# Patient Record
Sex: FEMALE | Race: WHITE | NOT HISPANIC OR LATINO | Employment: FULL TIME | URBAN - METROPOLITAN AREA
[De-identification: names, ages, dates, MRNs, and addresses within clinical notes are randomized per-mention and may not be internally consistent; named-entity substitution may affect disease eponyms.]

---

## 2017-01-19 DIAGNOSIS — Z78.0 MENOPAUSE: ICD-10-CM

## 2017-01-19 RX ORDER — ESTRADIOL 1 MG/1
TABLET ORAL
Qty: 90 TABLET | Refills: 0 | Status: SHIPPED | OUTPATIENT
Start: 2017-01-19 | End: 2017-05-05 | Stop reason: SDUPTHER

## 2017-02-22 ENCOUNTER — OFFICE VISIT (OUTPATIENT)
Dept: INTERNAL MEDICINE | Facility: CLINIC | Age: 52
End: 2017-02-22
Payer: COMMERCIAL

## 2017-02-22 ENCOUNTER — LAB VISIT (OUTPATIENT)
Dept: LAB | Facility: HOSPITAL | Age: 52
End: 2017-02-22
Attending: INTERNAL MEDICINE
Payer: COMMERCIAL

## 2017-02-22 VITALS
TEMPERATURE: 99 F | HEART RATE: 120 BPM | WEIGHT: 130.31 LBS | RESPIRATION RATE: 14 BRPM | SYSTOLIC BLOOD PRESSURE: 139 MMHG | HEIGHT: 65 IN | BODY MASS INDEX: 21.71 KG/M2 | DIASTOLIC BLOOD PRESSURE: 82 MMHG

## 2017-02-22 DIAGNOSIS — F41.9 ANXIETY: Primary | ICD-10-CM

## 2017-02-22 DIAGNOSIS — R00.0 TACHYCARDIA: ICD-10-CM

## 2017-02-22 LAB — TSH SERPL DL<=0.005 MIU/L-ACNC: 1.99 UIU/ML

## 2017-02-22 PROCEDURE — 99214 OFFICE O/P EST MOD 30 MIN: CPT | Mod: S$GLB,,, | Performed by: INTERNAL MEDICINE

## 2017-02-22 PROCEDURE — 84443 ASSAY THYROID STIM HORMONE: CPT

## 2017-02-22 PROCEDURE — 1160F RVW MEDS BY RX/DR IN RCRD: CPT | Mod: S$GLB,,, | Performed by: INTERNAL MEDICINE

## 2017-02-22 PROCEDURE — 99999 PR PBB SHADOW E&M-EST. PATIENT-LVL III: CPT | Mod: PBBFAC,,, | Performed by: INTERNAL MEDICINE

## 2017-02-22 PROCEDURE — 93005 ELECTROCARDIOGRAM TRACING: CPT | Mod: S$GLB,,, | Performed by: INTERNAL MEDICINE

## 2017-02-22 PROCEDURE — 36415 COLL VENOUS BLD VENIPUNCTURE: CPT | Mod: PO

## 2017-02-22 PROCEDURE — 93010 ELECTROCARDIOGRAM REPORT: CPT | Mod: S$GLB,,, | Performed by: INTERNAL MEDICINE

## 2017-02-22 RX ORDER — CITALOPRAM 40 MG/1
TABLET, FILM COATED ORAL
Qty: 30 TABLET | Refills: 12 | Status: SHIPPED | OUTPATIENT
Start: 2017-02-22 | End: 2018-03-05

## 2017-02-22 RX ORDER — FLUTICASONE PROPIONATE 50 MCG
1 SPRAY, SUSPENSION (ML) NASAL CONTINUOUS PRN
Qty: 1 BOTTLE | Refills: 3 | Status: SHIPPED | OUTPATIENT
Start: 2017-02-22 | End: 2022-10-23 | Stop reason: SDUPTHER

## 2017-02-22 RX ORDER — FLUTICASONE PROPIONATE 50 MCG
1 SPRAY, SUSPENSION (ML) NASAL CONTINUOUS PRN
Qty: 1 BOTTLE | Refills: 3 | Status: SHIPPED | OUTPATIENT
Start: 2017-02-22 | End: 2017-02-22 | Stop reason: SDUPTHER

## 2017-02-22 NOTE — PROGRESS NOTES
CC: followup of anxiety  HPI:  The patient is a 51 y.o. year old female who presents to the office for followup of anxiety.  The patient reports her symptoms have improved.  She is having a much better experience at work.  The patient states she is sleeping okay at night.  She is maintaining her sobriety.     PAST MEDICAL HISTORY:  Past Medical History   Diagnosis Date    Anxiety     Substance abuse      ETOH, Cocaine, Methamphetamines (Ecstasy, Crystal meth)       SURGICAL HISTORY:  Past Surgical History   Procedure Laterality Date     section         MEDS:  Medcard reviewed and updated    ALLERGIES: Allergy Card reviewed and updated    SOCIAL HISTORY:   The patient is a nonsmoker.    PE:   APPEARANCE: Well nourished, well developed, in no acute distress.    CHEST: Lungs clear to auscultation with unlabored respirations.  CARDIOVASCULAR: Tachycardic S1, S2. No murmurs. No carotid bruits. No pedal edema.  ABDOMEN: Bowel sounds normal. Not distended. Soft. No tenderness or masses.   PSYCHIATRIC: The patient is oriented to person, place, and time and has a pleasant affect.        ASSESSMENT/PLAN:  Soledad was seen today for follow-up.    Diagnoses and all orders for this visit:    Anxiety  -     Controlled  -     Continue current medication    Tachycardia  -     EKG 12-lead  -     TSH; Future

## 2017-02-22 NOTE — MR AVS SNAPSHOT
[facility]   [facility]  [place] [address]  Phone: [phone]  Fax: [phone]                  Soledad Onofre   2017 8:00 AM   Office Visit    Description:  Female : 1965   Provider:  Grace Casillas MD   Department:  [facility]           Reason for Visit     Follow-up           Diagnoses this Visit        Comments    Anxiety    -  Primary     Tachycardia                To Do List           Future Appointments        Provider Department Dept Phone    2017 3:40 PM Grace Casillas MD [facility] [phone]      Goals (5 Years of Data)     None      Follow-Up and Disposition     Return in about 6 months (around 2017).       These Medications        Disp Refills Start End    citalopram (CELEXA) 40 MG tablet 30 tablet 12 2017     TAKE 1 TABLET (40 MG TOTAL) BY MOUTH EVERY MORNING.    Pharmacy: [facility]/pharmacy #[id number] - [place] - [address] Ph #: [phone]       fluticasone (FLONASE) 50 mcg/actuation nasal spray 1 Bottle 3 2017     1 spray by Each Nare route continuous prn for Rhinitis. - Each Nare    Pharmacy: [facility]/pharmacy #[id number] - [place] - [address] Ph #: [phone]         [facility]     [facility] On Call Nurse Care Line - 24/7 Assistance  Registered nurses in the [facility] On Call Center provide clinical advisement, health education, appointment booking, and other advisory services.  Call for this free service at [phone].             Medications           Message regarding Medications     Verify the changes and/or additions to your medication regime listed below are the same as discussed with your clinician today.  If any of these changes or additions are incorrect, please notify your healthcare provider.             Verify that the below list of medications is an accurate representation of the medications you are currently taking.  If  "none reported, the list may be blank. If incorrect, please contact your healthcare provider. Carry this list with you in case of emergency.           Current Medications     citalopram (CELEXA) 40 MG tablet TAKE 1 TABLET (40 MG TOTAL) BY MOUTH EVERY MORNING.    estradiol (ESTRACE) 1 MG tablet TAKE 1 TABLET (1 MG TOTAL) BY MOUTH ONCE DAILY.    fluticasone (FLONASE) 50 mcg/actuation nasal spray 1 spray by Each Nare route continuous prn for Rhinitis.    norethindrone (MICRONOR) 0.35 mg tablet Take 1 tablet (0.35 mg total) by mouth once daily.    trazodone (DESYREL) 100 MG tablet Take 1 tablet (100 mg total) by mouth nightly as needed for Insomnia.           Clinical Reference Information           Your Vitals Were     BP Pulse Temp Resp Height Weight    139/82 (BP Location: Left arm, Patient Position: Sitting, BP Method: Automatic) 120 98.5 °F (36.9 °C) (Oral) 14 5' 5" (1.651 m) 59.1 kg (130 lb 4.7 oz)    Last Period BMI             05/22/2014 21.68 kg/m2         Blood Pressure          Most Recent Value    BP  139/82      Allergies as of 2/22/2017     No Known Allergies      Immunizations Administered on Date of Encounter - 2/22/2017     None      Orders Placed During Today's Visit      Normal Orders This Visit    EKG 12-lead     Future Labs/Procedures Expected by Expires    TSH  2/22/2017 4/23/2018      [facility] Sign-Up     Activating your [facility] account is as easy as 1-2-3!     1) Visit [email or web address], select Sign Up Now, enter this activation code and your date of birth, then select Next.  [email or web address]  Expires: 4/8/2017  8:40 AM      2) Create a username and password to use when you visit [facility] in the future and select a security question in case you lose your password and select Next.    3) Enter your e-mail address and click Sign Up!    Additional Information  If you have questions, please e-mail [email or web address] or call [phone] to talk to our [facility] staff. Remember, [facility] is NOT to " be used for urgent needs. For medical emergencies, dial 911.         Language Assistance Services     ATTENTION: Language assistance services are available, free of charge. Please call [phone].      ATENCIÓN: Si habla edi, tiene a smith disposición servicios gratuitos de asistencia lingüística. Llame al [phone].     CHÚ Ý: N?u b?n nói Ti?ng Vi?t, có các d?ch v? h? tr? ngôn ng? mi?n phí dành cho b?n. G?i s? [phone].         [facility] complies with applicable Federal civil rights laws and does not discriminate on the basis of race, color, national origin, age, disability, or sex.

## 2017-02-23 ENCOUNTER — TELEPHONE (OUTPATIENT)
Dept: INTERNAL MEDICINE | Facility: CLINIC | Age: 52
End: 2017-02-23

## 2017-03-07 ENCOUNTER — TELEPHONE (OUTPATIENT)
Dept: INTERNAL MEDICINE | Facility: CLINIC | Age: 52
End: 2017-03-07

## 2017-03-07 NOTE — LETTER
[place] [facility]  Internal Medicine  [address]  [place] [address]  Phone: [phone]  Fax: [phone]   March 7, 2017     Patient: Soledad Onofre   YOB: 1965   Date of Visit: 3/7/2017       To Whom it May Concern:    Soledad Onofre was out of work on 03/07/2017 due to her own personal chronic illness.  Soledad may return to work on 03/08/2017 with no restrictions and normal work activities.   If you have any questions or concerns, please don't hesitate to call.    Sincerely,         Marie Valenica MA

## 2017-03-07 NOTE — TELEPHONE ENCOUNTER
----- Message from Betsy English sent at 3/7/2017  1:18 PM CST -----  Contact: self  [phone]  Patient would like to get test results.  Name of test (lab, mammo, etc.):  EKG and labs  Date of test:   02/22  Ordering provider: Dr Casillas  Where was the test performed: [facility]  Comments: Pt states she has been waiting to hear from someone with these results and would like to be called back today if possible.

## 2017-03-07 NOTE — TELEPHONE ENCOUNTER
Spoke with the patient and advised her of the TSH and she wants to know the results for the EKG also patient state she has developed a hemorrhoid and she missed work today as it was swollen

## 2017-03-08 NOTE — TELEPHONE ENCOUNTER
Patient was given paper work to take to her employer as far as the hemorrhoid Rx advised to use OTC since she states they where shrinking. There was no answer lmom for a return call

## 2017-03-16 ENCOUNTER — TELEPHONE (OUTPATIENT)
Dept: INTERNAL MEDICINE | Facility: CLINIC | Age: 52
End: 2017-03-16

## 2017-03-16 NOTE — TELEPHONE ENCOUNTER
----- Message from Amy Menard MA sent at 3/15/2017 10:26 AM CDT -----  Contact: [phone]  Pt stated that she would like to discuss some Very Personal issues with the Dr or the Staff. Please advise and call. Thanks!

## 2017-03-16 NOTE — TELEPHONE ENCOUNTER
Spoke to pt and after a brief discuss she  Realized that she should have callled  Her gyn doctor because  It about the med he gave her .  She will call her gyn doctor

## 2017-05-04 RX ORDER — CITALOPRAM 40 MG/1
TABLET, FILM COATED ORAL
Qty: 30 TABLET | Refills: 2 | Status: SHIPPED | OUTPATIENT
Start: 2017-05-04 | End: 2017-08-26 | Stop reason: SDUPTHER

## 2017-05-05 DIAGNOSIS — Z78.0 MENOPAUSE: ICD-10-CM

## 2017-05-05 RX ORDER — ESTRADIOL 1 MG/1
TABLET ORAL
Qty: 90 TABLET | Refills: 0 | Status: SHIPPED | OUTPATIENT
Start: 2017-05-05 | End: 2017-06-23 | Stop reason: SDUPTHER

## 2017-05-23 ENCOUNTER — TELEPHONE (OUTPATIENT)
Dept: INTERNAL MEDICINE | Facility: CLINIC | Age: 52
End: 2017-05-23

## 2017-05-23 NOTE — TELEPHONE ENCOUNTER
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  Pt would like a call when fax is sent

## 2017-06-09 ENCOUNTER — TELEPHONE (OUTPATIENT)
Dept: INTERNAL MEDICINE | Facility: CLINIC | Age: 52
End: 2017-06-09

## 2017-06-09 DIAGNOSIS — Z00.00 ROUTINE GENERAL MEDICAL EXAMINATION AT A HEALTH CARE FACILITY: Primary | ICD-10-CM

## 2017-06-09 NOTE — TELEPHONE ENCOUNTER
----- Message from Sirena Hartley sent at 6/9/2017  2:26 PM CDT -----  Contact: Pt [phone]  Doctor appointment and lab have been scheduled.  Please link lab orders to the lab appointment.  Date of doctor appointment:  06-23-17  Physical or EP:  Physical  Date of lab appointment:  06-16-17  Comments:

## 2017-06-16 ENCOUNTER — LAB VISIT (OUTPATIENT)
Dept: LAB | Facility: HOSPITAL | Age: 52
End: 2017-06-16
Attending: INTERNAL MEDICINE
Payer: COMMERCIAL

## 2017-06-16 DIAGNOSIS — Z00.00 ROUTINE GENERAL MEDICAL EXAMINATION AT A HEALTH CARE FACILITY: ICD-10-CM

## 2017-06-16 LAB
25(OH)D3+25(OH)D2 SERPL-MCNC: 29 NG/ML
ALBUMIN SERPL BCP-MCNC: 3.8 G/DL
ALP SERPL-CCNC: 65 U/L
ALT SERPL W/O P-5'-P-CCNC: 18 U/L
ANION GAP SERPL CALC-SCNC: 8 MMOL/L
AST SERPL-CCNC: 19 U/L
BASOPHILS # BLD AUTO: 0.04 K/UL
BASOPHILS NFR BLD: 0.4 %
BILIRUB SERPL-MCNC: 0.6 MG/DL
BUN SERPL-MCNC: 12 MG/DL
CALCIUM SERPL-MCNC: 9.1 MG/DL
CHLORIDE SERPL-SCNC: 105 MMOL/L
CHOLEST/HDLC SERPL: 2.5 {RATIO}
CO2 SERPL-SCNC: 25 MMOL/L
CREAT SERPL-MCNC: 0.9 MG/DL
DIFFERENTIAL METHOD: NORMAL
EOSINOPHIL # BLD AUTO: 0.1 K/UL
EOSINOPHIL NFR BLD: 1.2 %
ERYTHROCYTE [DISTWIDTH] IN BLOOD BY AUTOMATED COUNT: 14.3 %
EST. GFR  (AFRICAN AMERICAN): >60 ML/MIN/1.73 M^2
EST. GFR  (NON AFRICAN AMERICAN): >60 ML/MIN/1.73 M^2
GLUCOSE SERPL-MCNC: 69 MG/DL
HCT VFR BLD AUTO: 40.8 %
HDL/CHOLESTEROL RATIO: 39.3 %
HDLC SERPL-MCNC: 191 MG/DL
HDLC SERPL-MCNC: 75 MG/DL
HGB BLD-MCNC: 13.2 G/DL
LDLC SERPL CALC-MCNC: 99.8 MG/DL
LYMPHOCYTES # BLD AUTO: 2.7 K/UL
LYMPHOCYTES NFR BLD: 29.6 %
MCH RBC QN AUTO: 30.7 PG
MCHC RBC AUTO-ENTMCNC: 32.4 %
MCV RBC AUTO: 95 FL
MONOCYTES # BLD AUTO: 0.5 K/UL
MONOCYTES NFR BLD: 5.3 %
NEUTROPHILS # BLD AUTO: 5.9 K/UL
NEUTROPHILS NFR BLD: 63.3 %
NONHDLC SERPL-MCNC: 116 MG/DL
PLATELET # BLD AUTO: 274 K/UL
PMV BLD AUTO: 10.3 FL
POTASSIUM SERPL-SCNC: 4 MMOL/L
PROT SERPL-MCNC: 7 G/DL
RBC # BLD AUTO: 4.3 M/UL
SODIUM SERPL-SCNC: 138 MMOL/L
TRIGL SERPL-MCNC: 81 MG/DL
TSH SERPL DL<=0.005 MIU/L-ACNC: 1.17 UIU/ML
WBC # BLD AUTO: 9.27 K/UL

## 2017-06-16 PROCEDURE — 84443 ASSAY THYROID STIM HORMONE: CPT

## 2017-06-16 PROCEDURE — 83036 HEMOGLOBIN GLYCOSYLATED A1C: CPT

## 2017-06-16 PROCEDURE — 80053 COMPREHEN METABOLIC PANEL: CPT

## 2017-06-16 PROCEDURE — 80061 LIPID PANEL: CPT

## 2017-06-16 PROCEDURE — 36415 COLL VENOUS BLD VENIPUNCTURE: CPT | Mod: PO

## 2017-06-16 PROCEDURE — 85025 COMPLETE CBC W/AUTO DIFF WBC: CPT

## 2017-06-16 PROCEDURE — 82306 VITAMIN D 25 HYDROXY: CPT

## 2017-06-17 LAB
ESTIMATED AVG GLUCOSE: 105 MG/DL
HBA1C MFR BLD HPLC: 5.3 %

## 2017-06-23 ENCOUNTER — OFFICE VISIT (OUTPATIENT)
Dept: INTERNAL MEDICINE | Facility: CLINIC | Age: 52
End: 2017-06-23
Payer: COMMERCIAL

## 2017-06-23 VITALS
TEMPERATURE: 98 F | BODY MASS INDEX: 22.15 KG/M2 | DIASTOLIC BLOOD PRESSURE: 70 MMHG | OXYGEN SATURATION: 98 % | HEIGHT: 65 IN | HEART RATE: 102 BPM | RESPIRATION RATE: 18 BRPM | WEIGHT: 132.94 LBS | SYSTOLIC BLOOD PRESSURE: 124 MMHG

## 2017-06-23 DIAGNOSIS — Z00.00 ROUTINE GENERAL MEDICAL EXAMINATION AT A HEALTH CARE FACILITY: Primary | ICD-10-CM

## 2017-06-23 DIAGNOSIS — Z78.0 MENOPAUSE: ICD-10-CM

## 2017-06-23 PROCEDURE — 99999 PR PBB SHADOW E&M-EST. PATIENT-LVL III: CPT | Mod: PBBFAC,,, | Performed by: INTERNAL MEDICINE

## 2017-06-23 PROCEDURE — 99396 PREV VISIT EST AGE 40-64: CPT | Mod: S$GLB,,, | Performed by: INTERNAL MEDICINE

## 2017-06-23 RX ORDER — ESTRADIOL 1 MG/1
TABLET ORAL
Qty: 90 TABLET | Refills: 0 | Status: SHIPPED | OUTPATIENT
Start: 2017-06-23 | End: 2018-04-24 | Stop reason: SDUPTHER

## 2017-06-23 RX ORDER — DOXYCYCLINE 100 MG/1
CAPSULE ORAL
COMMUNITY
Start: 2017-06-20 | End: 2018-03-05

## 2017-06-23 NOTE — LETTER
June 23, 2017                 [facility]  [address]  [place] [address]  Phone: [phone]  Fax: [phone] June 23, 2017     Patient: Soledad Onofre    YOB: 1965   Date of Visit: 6/23/2017       To Whom It May Concern:    It is my medical opinion that Soledad Onofre work a reduced schedule.  I recommend she is off from work alternating Fridays.    If you have any questions or concerns, please don't hesitate to call.    Sincerely,        Grace Casillas MD

## 2017-06-23 NOTE — PROGRESS NOTES
The patient is a 52 y.o. old female who presents to the office for a physical.  Review of labs reveals a mildly depressed vitamin D.    PAST MEDICAL HISTORY  Past Medical History:   Diagnosis Date    Anxiety     Substance abuse     ETOH, Cocaine, Methamphetamines (Ecstasy, Crystal meth)       SURGICAL HISTORY:  Past Surgical History:   Procedure Laterality Date     SECTION           MEDS:  Medcard reviewed and updated    ALLERGIES: Allergy Card reviewed and updated    SOCIAL HISTORY:   The patient is a nonsmoker, denies alcohol or illicit drug use.    ROS:  GENERAL: No chills, fatigability or weight loss.  Recent fever.  SKIN: No rashes.  HEAD: No headaches or recent head trauma.  EYES: No photophobia, ocular pain or diplopia.  EARS: Denies ear pain, discharge or vertigo.  NOSE: No epistaxis.  Positive postnasal drip.  MOUTH & THROAT: No hoarseness or change in voice.   NODES: Denies swollen glands.  CHEST: Denies shortness of breath or wheezing.  Occasionap cough.  CARDIOVASCULAR: Denies chest pain or palpitations.  ABDOMEN: Appetite fine. Denies diarrhea, abdominal pain, constipation or blood in stool.  URINARY: No dysuria or hematuria.  MUSCULOSKELETAL: No joint stiffness or swelling. Denies back pain.  NEUROLOGIC: No history of seizures.  ENDOCRINE: Denies polyuria or polydipsia.  PSYCHIATRIC: Denies mood swings, depression, anxiety, homicidal or suicidal thoughts.    SCREENINGS:  Last cholesterol: 2017  Last colonoscopy:   Last mammogram: 2016  Last Pap smear: 2016  Last tetanus:   Last Pneumovax: none  Last eye exam:   Last bone density: none  Last menstrual period: unknown    PE:   Vitals:  Vitals:    17 1604   BP: 124/70   Pulse: 102   Resp: 18   Temp: 98.2 °F (36.8 °C)       APPEARANCE: Well nourished, well developed, in no acute distress.    EYES: Sclerae anicteric. PERRL. EOMI.      EARS: TM's intact. No retraction or perforation.    NOSE: Mucosa pink.  Airway clear.  MOUTH & THROAT: No tonsillar enlargement. No pharyngeal erythema or exudate. No stridor.  NECK: Supple, no thyromegaly.  CHEST: Lungs clear to auscultation with unlabored respirations.  CARDIOVASCULAR: Normal S1, S2. No murmurs. No carotid bruits. No pedal edema.  ABDOMEN: Bowel sounds normal. Not distended. Soft. No tenderness or masses.   MUSCULOSKELETAL:  Normal gait, no cyanosis or clubbing.   SKIN: Normal skin turgor, warm and dry.  NEUROLOGIC: Cranial Nerves: Intact.  PSYCHIATRIC: The patient is oriented to person, place, and time and has a pleasant affect.        ASSESSMENT/PLAN:  Soledad was seen today for annual exam.    Diagnoses and all orders for this visit:    Routine general medical examination at a health care facility  -     Labs reviewed  -     Replace vitamin D

## 2017-07-25 ENCOUNTER — TELEPHONE (OUTPATIENT)
Dept: OBSTETRICS AND GYNECOLOGY | Facility: CLINIC | Age: 52
End: 2017-07-25

## 2017-07-25 DIAGNOSIS — Z12.39 BREAST CANCER SCREENING: Primary | ICD-10-CM

## 2017-07-25 NOTE — TELEPHONE ENCOUNTER
----- Message from Shantel Schwarz sent at 7/25/2017  8:35 AM CDT -----  Contact: self/[phone]  Schedule mammogram

## 2017-07-25 NOTE — TELEPHONE ENCOUNTER
----- Message from Shantel Schwarz sent at 7/25/2017  8:35 AM CDT -----  Contact: self/[phone]  Schedule mammogram

## 2017-08-25 DIAGNOSIS — Z12.11 COLON CANCER SCREENING: ICD-10-CM

## 2017-08-28 RX ORDER — CITALOPRAM 40 MG/1
TABLET, FILM COATED ORAL
Qty: 30 TABLET | Refills: 2 | Status: SHIPPED | OUTPATIENT
Start: 2017-08-28 | End: 2018-03-05

## 2017-08-28 RX ORDER — ACETAMINOPHEN AND CODEINE PHOSPHATE 120; 12 MG/5ML; MG/5ML
SOLUTION ORAL
Qty: 84 TABLET | Refills: 3 | Status: SHIPPED | OUTPATIENT
Start: 2017-08-28 | End: 2018-10-23 | Stop reason: SDUPTHER

## 2017-09-12 ENCOUNTER — PATIENT MESSAGE (OUTPATIENT)
Dept: INTERNAL MEDICINE | Facility: CLINIC | Age: 52
End: 2017-09-12

## 2017-09-14 ENCOUNTER — TELEPHONE (OUTPATIENT)
Dept: INTERNAL MEDICINE | Facility: CLINIC | Age: 52
End: 2017-09-14

## 2017-09-14 DIAGNOSIS — F32.A DEPRESSION, UNSPECIFIED DEPRESSION TYPE: Primary | ICD-10-CM

## 2017-09-15 ENCOUNTER — PATIENT MESSAGE (OUTPATIENT)
Dept: INTERNAL MEDICINE | Facility: CLINIC | Age: 52
End: 2017-09-15

## 2017-09-28 RX ORDER — TRAZODONE HYDROCHLORIDE 100 MG/1
100 TABLET ORAL NIGHTLY PRN
Qty: 30 TABLET | Refills: 12 | Status: SHIPPED | OUTPATIENT
Start: 2017-09-28 | End: 2018-05-23 | Stop reason: SDUPTHER

## 2018-02-01 RX ORDER — CITALOPRAM 40 MG/1
TABLET, FILM COATED ORAL
Qty: 30 TABLET | Refills: 3 | Status: SHIPPED | OUTPATIENT
Start: 2018-02-01 | End: 2018-07-18 | Stop reason: SDUPTHER

## 2018-02-23 ENCOUNTER — TELEPHONE (OUTPATIENT)
Dept: INTERNAL MEDICINE | Facility: CLINIC | Age: 53
End: 2018-02-23

## 2018-02-23 ENCOUNTER — TELEPHONE (OUTPATIENT)
Dept: OBSTETRICS AND GYNECOLOGY | Facility: CLINIC | Age: 53
End: 2018-02-23

## 2018-02-23 DIAGNOSIS — Z12.39 BREAST CANCER SCREENING: Primary | ICD-10-CM

## 2018-02-23 DIAGNOSIS — Z00.00 ROUTINE GENERAL MEDICAL EXAMINATION AT A HEALTH CARE FACILITY: Primary | ICD-10-CM

## 2018-02-23 NOTE — TELEPHONE ENCOUNTER
----- Message from Susie Forrester sent at 2/22/2018  4:43 PM CST -----  Contact: Self   Doctor appointment and lab have been scheduled.  Please link lab orders to the lab appointment.  Date of doctor appointment:  6/25  Physical or EP:  Epp  Date of lab appointment:  6/18  Comments:

## 2018-02-23 NOTE — TELEPHONE ENCOUNTER
----- Message from Sandi Blackmon sent at 2/22/2018  4:51 PM CST -----  Contact: Self [phone]  Patient is calling to get Mammo Orders. Please advice

## 2018-03-05 ENCOUNTER — OFFICE VISIT (OUTPATIENT)
Dept: OBSTETRICS AND GYNECOLOGY | Facility: CLINIC | Age: 53
End: 2018-03-05
Payer: COMMERCIAL

## 2018-03-05 VITALS
BODY MASS INDEX: 23.07 KG/M2 | HEIGHT: 65 IN | DIASTOLIC BLOOD PRESSURE: 86 MMHG | WEIGHT: 138.44 LBS | SYSTOLIC BLOOD PRESSURE: 124 MMHG

## 2018-03-05 DIAGNOSIS — Z01.419 WELL WOMAN EXAM WITH ROUTINE GYNECOLOGICAL EXAM: Primary | ICD-10-CM

## 2018-03-05 DIAGNOSIS — Z78.0 MENOPAUSE: ICD-10-CM

## 2018-03-05 PROCEDURE — 99396 PREV VISIT EST AGE 40-64: CPT | Mod: S$GLB,,, | Performed by: OBSTETRICS & GYNECOLOGY

## 2018-03-05 PROCEDURE — 88175 CYTOPATH C/V AUTO FLUID REDO: CPT

## 2018-03-05 PROCEDURE — 99999 PR PBB SHADOW E&M-EST. PATIENT-LVL IV: CPT | Mod: PBBFAC,,, | Performed by: OBSTETRICS & GYNECOLOGY

## 2018-03-05 NOTE — PROGRESS NOTES
Subjective:       Patient ID: Soledad Onofre is a 52 y.o. female.    Chief Complaint: Well Woman (the estrodiol and birth control pills make her break out pimples)     has renal disease;she has XS family responsibility on top of her own depression problems.  Taking HRT    HPI  Review of Systems   Gastrointestinal: Negative for abdominal distention, abdominal pain, constipation and nausea.   Genitourinary: Negative for dyspareunia, dysuria, genital sores, pelvic pain, vaginal bleeding and vaginal discharge.       Objective:      Physical Exam   Constitutional: She appears well-developed and well-nourished.   Pulmonary/Chest: Right breast exhibits no mass, no nipple discharge, no skin change and no tenderness. Left breast exhibits no mass, no nipple discharge, no skin change and no tenderness.   Abdominal: Soft. Bowel sounds are normal. She exhibits no distension and no mass. There is no tenderness. There is no rebound and no guarding. Hernia confirmed negative in the right inguinal area and confirmed negative in the left inguinal area.   Genitourinary: Rectum normal, vagina normal and uterus normal. No breast tenderness or discharge. There is no lesion on the right labia. There is no lesion on the left labia. Uterus is not fixed and not tender. Cervix exhibits no motion tenderness, no discharge and no friability. Right adnexum displays no mass, no tenderness and no fullness. Left adnexum displays no mass, no tenderness and no fullness. No tenderness in the vagina. No vaginal discharge found.   Lymphadenopathy:        Right: No inguinal adenopathy present.        Left: No inguinal adenopathy present.       Assessment:       1. Well woman exam with routine gynecological exam    2. Menopause        Plan:         annual gyn visit; pap and mammmogram; continue meds.

## 2018-03-09 ENCOUNTER — PATIENT MESSAGE (OUTPATIENT)
Dept: ADMINISTRATIVE | Facility: OTHER | Age: 53
End: 2018-03-09

## 2018-03-12 ENCOUNTER — TELEPHONE (OUTPATIENT)
Dept: OBSTETRICS AND GYNECOLOGY | Facility: CLINIC | Age: 53
End: 2018-03-12

## 2018-03-12 NOTE — LETTER
March 13, 2018    Soledad Maxime Ktvon  [address] [address]             [facility]  [address]  [address] [facility]  [place] [address]  Phone: [phone] Dear MsJannie Ktvon:    The results of your most recent Pap smear are normal. This means that no cancerous or precancerous cells were seen. We recommend that you come back in 1 year for your annual exam and 1 years for your next Pap smear.    If you have any questions or concerns, please don't hesitate to call.    Sincerely,        Dr. Contreras Cabrera

## 2018-03-27 ENCOUNTER — HOSPITAL ENCOUNTER (OUTPATIENT)
Dept: RADIOLOGY | Facility: HOSPITAL | Age: 53
Discharge: HOME | End: 2018-03-27
Attending: OBSTETRICS & GYNECOLOGY
Payer: COMMERCIAL

## 2018-03-27 VITALS — WEIGHT: 138 LBS | HEIGHT: 65 IN | BODY MASS INDEX: 22.99 KG/M2

## 2018-03-27 DIAGNOSIS — Z01.419 WELL WOMAN EXAM WITH ROUTINE GYNECOLOGICAL EXAM: ICD-10-CM

## 2018-03-27 PROCEDURE — 77063 BREAST TOMOSYNTHESIS BI: CPT | Mod: 26,,, | Performed by: RADIOLOGY

## 2018-03-27 PROCEDURE — 77067 SCR MAMMO BI INCL CAD: CPT | Mod: TC

## 2018-03-27 PROCEDURE — 77067 SCR MAMMO BI INCL CAD: CPT | Mod: 26,,, | Performed by: RADIOLOGY

## 2018-04-24 DIAGNOSIS — Z78.0 MENOPAUSE: ICD-10-CM

## 2018-04-24 RX ORDER — ESTRADIOL 1 MG/1
TABLET ORAL
Qty: 90 TABLET | Refills: 0 | Status: SHIPPED | OUTPATIENT
Start: 2018-04-24 | End: 2018-07-18 | Stop reason: SDUPTHER

## 2018-05-24 RX ORDER — TRAZODONE HYDROCHLORIDE 100 MG/1
100 TABLET ORAL NIGHTLY PRN
Qty: 30 TABLET | Refills: 5 | Status: SHIPPED | OUTPATIENT
Start: 2018-05-24 | End: 2018-11-26 | Stop reason: SDUPTHER

## 2018-06-23 ENCOUNTER — LAB VISIT (OUTPATIENT)
Dept: LAB | Facility: HOSPITAL | Age: 53
End: 2018-06-23
Attending: INTERNAL MEDICINE
Payer: COMMERCIAL

## 2018-06-23 DIAGNOSIS — Z00.00 ROUTINE GENERAL MEDICAL EXAMINATION AT A HEALTH CARE FACILITY: ICD-10-CM

## 2018-06-23 LAB
25(OH)D3+25(OH)D2 SERPL-MCNC: 48 NG/ML
ALBUMIN SERPL BCP-MCNC: 3.7 G/DL
ALP SERPL-CCNC: 57 U/L
ALT SERPL W/O P-5'-P-CCNC: 17 U/L
ANION GAP SERPL CALC-SCNC: 8 MMOL/L
AST SERPL-CCNC: 16 U/L
BASOPHILS # BLD AUTO: 0.07 K/UL
BASOPHILS NFR BLD: 1 %
BILIRUB SERPL-MCNC: 0.6 MG/DL
BUN SERPL-MCNC: 18 MG/DL
CALCIUM SERPL-MCNC: 9.4 MG/DL
CHLORIDE SERPL-SCNC: 108 MMOL/L
CHOLEST SERPL-MCNC: 201 MG/DL
CHOLEST/HDLC SERPL: 2.9 {RATIO}
CO2 SERPL-SCNC: 26 MMOL/L
CREAT SERPL-MCNC: 1 MG/DL
DIFFERENTIAL METHOD: ABNORMAL
EOSINOPHIL # BLD AUTO: 0.3 K/UL
EOSINOPHIL NFR BLD: 3.7 %
ERYTHROCYTE [DISTWIDTH] IN BLOOD BY AUTOMATED COUNT: 13.5 %
EST. GFR  (AFRICAN AMERICAN): >60 ML/MIN/1.73 M^2
EST. GFR  (NON AFRICAN AMERICAN): >60 ML/MIN/1.73 M^2
ESTIMATED AVG GLUCOSE: 94 MG/DL
GLUCOSE SERPL-MCNC: 99 MG/DL
HBA1C MFR BLD HPLC: 4.9 %
HCT VFR BLD AUTO: 40.4 %
HDLC SERPL-MCNC: 70 MG/DL
HDLC SERPL: 34.8 %
HGB BLD-MCNC: 13.1 G/DL
IMM GRANULOCYTES # BLD AUTO: 0.02 K/UL
IMM GRANULOCYTES NFR BLD AUTO: 0.3 %
LDLC SERPL CALC-MCNC: 108.8 MG/DL
LYMPHOCYTES # BLD AUTO: 3.3 K/UL
LYMPHOCYTES NFR BLD: 44.4 %
MCH RBC QN AUTO: 31.6 PG
MCHC RBC AUTO-ENTMCNC: 32.4 G/DL
MCV RBC AUTO: 97 FL
MONOCYTES # BLD AUTO: 0.6 K/UL
MONOCYTES NFR BLD: 7.7 %
NEUTROPHILS # BLD AUTO: 3.2 K/UL
NEUTROPHILS NFR BLD: 42.9 %
NONHDLC SERPL-MCNC: 131 MG/DL
NRBC BLD-RTO: 0 /100 WBC
PLATELET # BLD AUTO: ABNORMAL K/UL
PMV BLD AUTO: ABNORMAL FL
POTASSIUM SERPL-SCNC: 4.4 MMOL/L
PROT SERPL-MCNC: 6.7 G/DL
RBC # BLD AUTO: 4.15 M/UL
SODIUM SERPL-SCNC: 142 MMOL/L
TRIGL SERPL-MCNC: 111 MG/DL
TSH SERPL DL<=0.005 MIU/L-ACNC: 1.9 UIU/ML
WBC # BLD AUTO: 7.36 K/UL

## 2018-06-23 PROCEDURE — 80061 LIPID PANEL: CPT

## 2018-06-23 PROCEDURE — 84443 ASSAY THYROID STIM HORMONE: CPT

## 2018-06-23 PROCEDURE — 85025 COMPLETE CBC W/AUTO DIFF WBC: CPT

## 2018-06-23 PROCEDURE — 36415 COLL VENOUS BLD VENIPUNCTURE: CPT | Mod: PO

## 2018-06-23 PROCEDURE — 80053 COMPREHEN METABOLIC PANEL: CPT

## 2018-06-23 PROCEDURE — 82306 VITAMIN D 25 HYDROXY: CPT

## 2018-06-23 PROCEDURE — 83036 HEMOGLOBIN GLYCOSYLATED A1C: CPT

## 2018-07-18 DIAGNOSIS — Z78.0 MENOPAUSE: ICD-10-CM

## 2018-07-18 RX ORDER — ESTRADIOL 1 MG/1
TABLET ORAL
Qty: 90 TABLET | Refills: 1 | Status: SHIPPED | OUTPATIENT
Start: 2018-07-18 | End: 2019-03-31 | Stop reason: SDUPTHER

## 2018-07-18 RX ORDER — CITALOPRAM 40 MG/1
TABLET, FILM COATED ORAL
Qty: 30 TABLET | Refills: 6 | Status: SHIPPED | OUTPATIENT
Start: 2018-07-18 | End: 2018-11-26

## 2018-10-24 RX ORDER — ACETAMINOPHEN AND CODEINE PHOSPHATE 120; 12 MG/5ML; MG/5ML
SOLUTION ORAL
Qty: 84 TABLET | Refills: 0 | Status: SHIPPED | OUTPATIENT
Start: 2018-10-24 | End: 2019-01-17 | Stop reason: SDUPTHER

## 2018-10-26 ENCOUNTER — LAB VISIT (OUTPATIENT)
Dept: LAB | Facility: HOSPITAL | Age: 53
End: 2018-10-26
Attending: INTERNAL MEDICINE
Payer: COMMERCIAL

## 2018-10-26 ENCOUNTER — OFFICE VISIT (OUTPATIENT)
Dept: INTERNAL MEDICINE | Facility: CLINIC | Age: 53
End: 2018-10-26
Payer: COMMERCIAL

## 2018-10-26 VITALS
TEMPERATURE: 98 F | HEART RATE: 81 BPM | DIASTOLIC BLOOD PRESSURE: 66 MMHG | SYSTOLIC BLOOD PRESSURE: 90 MMHG | BODY MASS INDEX: 23.22 KG/M2 | WEIGHT: 136 LBS | HEIGHT: 64 IN

## 2018-10-26 DIAGNOSIS — Z00.00 ROUTINE GENERAL MEDICAL EXAMINATION AT A HEALTH CARE FACILITY: Primary | ICD-10-CM

## 2018-10-26 DIAGNOSIS — F32.A DEPRESSION, UNSPECIFIED DEPRESSION TYPE: Primary | ICD-10-CM

## 2018-10-26 DIAGNOSIS — Z00.00 ROUTINE GENERAL MEDICAL EXAMINATION AT A HEALTH CARE FACILITY: ICD-10-CM

## 2018-10-26 LAB
BASOPHILS # BLD AUTO: 0.06 K/UL
BASOPHILS NFR BLD: 0.9 %
DIFFERENTIAL METHOD: ABNORMAL
EOSINOPHIL # BLD AUTO: 0.2 K/UL
EOSINOPHIL NFR BLD: 3.1 %
ERYTHROCYTE [DISTWIDTH] IN BLOOD BY AUTOMATED COUNT: 13.7 %
HCT VFR BLD AUTO: 40.1 %
HGB BLD-MCNC: 12.8 G/DL
IMM GRANULOCYTES # BLD AUTO: 0.02 K/UL
IMM GRANULOCYTES NFR BLD AUTO: 0.3 %
LYMPHOCYTES # BLD AUTO: 2.1 K/UL
LYMPHOCYTES NFR BLD: 32.3 %
MCH RBC QN AUTO: 31.4 PG
MCHC RBC AUTO-ENTMCNC: 31.9 G/DL
MCV RBC AUTO: 98 FL
MONOCYTES # BLD AUTO: 0.5 K/UL
MONOCYTES NFR BLD: 8.2 %
NEUTROPHILS # BLD AUTO: 3.5 K/UL
NEUTROPHILS NFR BLD: 55.2 %
NRBC BLD-RTO: 0 /100 WBC
PLATELET # BLD AUTO: 147 K/UL
PMV BLD AUTO: 10.9 FL
RBC # BLD AUTO: 4.08 M/UL
WBC # BLD AUTO: 6.43 K/UL

## 2018-10-26 PROCEDURE — 99396 PREV VISIT EST AGE 40-64: CPT | Mod: S$GLB,,, | Performed by: INTERNAL MEDICINE

## 2018-10-26 PROCEDURE — 36415 COLL VENOUS BLD VENIPUNCTURE: CPT | Mod: PO

## 2018-10-26 PROCEDURE — 99999 PR PBB SHADOW E&M-EST. PATIENT-LVL III: CPT | Mod: PBBFAC,,, | Performed by: INTERNAL MEDICINE

## 2018-10-26 PROCEDURE — 85025 COMPLETE CBC W/AUTO DIFF WBC: CPT

## 2018-10-26 RX ORDER — TOBRAMYCIN AND DEXAMETHASONE 3; 1 MG/ML; MG/ML
SUSPENSION/ DROPS OPHTHALMIC
Refills: 2 | COMMUNITY
Start: 2018-07-24 | End: 2022-03-22

## 2018-10-26 RX ORDER — TRAMADOL HYDROCHLORIDE 50 MG/1
TABLET ORAL
Refills: 0 | COMMUNITY
Start: 2018-08-03 | End: 2018-11-26

## 2018-10-26 NOTE — PROGRESS NOTES
The patient is a 53 y.o. old female who presents to the office for a physical.  Review of labs reveals essentially normal results.    PAST MEDICAL HISTORY  Past Medical History:   Diagnosis Date    Anxiety     Substance abuse     ETOH, Cocaine, Methamphetamines (Ecstasy, Crystal meth)       SURGICAL HISTORY:  Past Surgical History:   Procedure Laterality Date     SECTION           MEDS:  Medcard reviewed and updated    ALLERGIES: Allergy Card reviewed and updated    SOCIAL HISTORY:   The patient is a nonsmoker, denies alcohol or illicit drug use.    ROS:  GENERAL: No fever, chills or weight loss.  Positive fatigue.  SKIN: No rashes.  HEAD: No headaches or recent head trauma.  EYES: Positive photophobia and blurred vision at night.  Denies ocular pain or diplopia.  EARS: Denies ear pain, discharge or vertigo.  NOSE: No epistaxis.  Positive postnasal drip.  MOUTH & THROAT: No hoarseness or change in voice.   NODES: Denies swollen glands.  CHEST: Denies shortness of breath, wheezing, cough and sputum production.  CARDIOVASCULAR: Denies chest pain or palpitations.  ABDOMEN: Appetite fine. Denies diarrhea, abdominal pain, constipation or blood in stool.  URINARY: No dysuria or hematuria.  MUSCULOSKELETAL: No joint stiffness or swelling. Positive constant low back pain without radiation for which she saw a chiropractor.  NEUROLOGIC: No history of seizures.  ENDOCRINE: Denies polyuria or polydipsia.  PSYCHIATRIC: Denies mood swings, anxiety, homicidal or suicidal thoughts.  Positive depression and anxiety.    SCREENINGS:  Last cholesterol:   Last colonoscopy/ fitkit: 2018, completed Monday- results pending  Last mammogram:   Last Pap smear:   Last tetanus: 2016  Last Pneumovax: none  Last eye exam: 2018  Last bone density: none  Last menstrual period: postmenopausal    PE:   Vitals:  Vitals:    10/26/18 0933   BP: 90/66   Pulse: 81   Temp: 98.1 °F (36.7 °C)       APPEARANCE: Well nourished, well  developed, in no acute distress.    EYES: Sclerae anicteric. PERRL. EOMI.      EARS: TM's intact. No retraction or perforation.    NOSE: Mucosa pink. Airway clear.  MOUTH & THROAT: No tonsillar enlargement. No pharyngeal erythema or exudate. No stridor.  NECK: Supple, no thyromegaly.  CHEST: Lungs clear to auscultation with unlabored respirations.  CARDIOVASCULAR: Normal S1, S2. No murmurs. No carotid bruits. No pedal edema.  ABDOMEN: Bowel sounds normal. Not distended. Soft. No tenderness or masses.   MUSCULOSKELETAL:  Normal gait, no cyanosis or clubbing.   SKIN: Normal skin turgor, warm and dry.  NEUROLOGIC: Cranial Nerves: Intact.=  PSYCHIATRIC: The patient is oriented to person, place, and time and has a pleasant affect.        ASSESSMENT/PLAN:  Soledad was seen today for annual exam.    Diagnoses and all orders for this visit:    Routine general medical examination at a health care facility  -     CBC auto differential; Future

## 2018-11-01 ENCOUNTER — TELEPHONE (OUTPATIENT)
Dept: INTERNAL MEDICINE | Facility: CLINIC | Age: 53
End: 2018-11-01

## 2018-11-01 ENCOUNTER — LAB VISIT (OUTPATIENT)
Dept: LAB | Facility: HOSPITAL | Age: 53
End: 2018-11-01
Attending: INTERNAL MEDICINE
Payer: COMMERCIAL

## 2018-11-01 DIAGNOSIS — Z12.11 COLON CANCER SCREENING: ICD-10-CM

## 2018-11-01 DIAGNOSIS — Z12.11 COLON CANCER SCREENING: Primary | ICD-10-CM

## 2018-11-01 LAB — HEMOCCULT STL QL IA: NEGATIVE

## 2018-11-01 PROCEDURE — 82274 ASSAY TEST FOR BLOOD FECAL: CPT

## 2018-11-14 ENCOUNTER — PATIENT MESSAGE (OUTPATIENT)
Dept: INTERNAL MEDICINE | Facility: CLINIC | Age: 53
End: 2018-11-14

## 2018-11-26 ENCOUNTER — OFFICE VISIT (OUTPATIENT)
Dept: PSYCHIATRY | Facility: CLINIC | Age: 53
End: 2018-11-26
Payer: COMMERCIAL

## 2018-11-26 VITALS
DIASTOLIC BLOOD PRESSURE: 63 MMHG | HEART RATE: 76 BPM | WEIGHT: 140.75 LBS | BODY MASS INDEX: 23.45 KG/M2 | SYSTOLIC BLOOD PRESSURE: 110 MMHG | HEIGHT: 65 IN

## 2018-11-26 DIAGNOSIS — F41.0 PANIC ATTACKS: ICD-10-CM

## 2018-11-26 DIAGNOSIS — F19.10 SUBSTANCE ABUSE: ICD-10-CM

## 2018-11-26 DIAGNOSIS — F33.2 SEVERE EPISODE OF RECURRENT MAJOR DEPRESSIVE DISORDER, WITHOUT PSYCHOTIC FEATURES: Primary | ICD-10-CM

## 2018-11-26 PROCEDURE — 99999 PR PBB SHADOW E&M-EST. PATIENT-LVL III: CPT | Mod: PBBFAC,,, | Performed by: NURSE PRACTITIONER

## 2018-11-26 PROCEDURE — 3008F BODY MASS INDEX DOCD: CPT | Mod: CPTII,S$GLB,, | Performed by: NURSE PRACTITIONER

## 2018-11-26 PROCEDURE — 99215 OFFICE O/P EST HI 40 MIN: CPT | Mod: S$GLB,,, | Performed by: NURSE PRACTITIONER

## 2018-11-26 RX ORDER — GABAPENTIN 300 MG/1
300 CAPSULE ORAL 3 TIMES DAILY
Qty: 90 CAPSULE | Refills: 0 | Status: SHIPPED | OUTPATIENT
Start: 2018-11-26 | End: 2019-01-23 | Stop reason: SDUPTHER

## 2018-11-26 RX ORDER — DULOXETIN HYDROCHLORIDE 20 MG/1
20 CAPSULE, DELAYED RELEASE ORAL 2 TIMES DAILY
Qty: 60 CAPSULE | Refills: 0 | Status: SHIPPED | OUTPATIENT
Start: 2018-11-26 | End: 2019-01-23 | Stop reason: SDUPTHER

## 2018-11-26 RX ORDER — TRAZODONE HYDROCHLORIDE 100 MG/1
100 TABLET ORAL NIGHTLY PRN
Qty: 30 TABLET | Refills: 5 | Status: SHIPPED | OUTPATIENT
Start: 2018-11-26 | End: 2019-01-23 | Stop reason: SDUPTHER
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
## 2018-12-18 ENCOUNTER — PATIENT MESSAGE (OUTPATIENT)
Dept: PSYCHIATRY | Facility: CLINIC | Age: 53
End: 2018-12-18

## 2018-12-18 ENCOUNTER — TELEPHONE (OUTPATIENT)
Dept: PSYCHIATRY | Facility: CLINIC | Age: 53
End: 2018-12-18

## 2019-01-17 RX ORDER — ACETAMINOPHEN AND CODEINE PHOSPHATE 120; 12 MG/5ML; MG/5ML
SOLUTION ORAL
Qty: 84 TABLET | Refills: 0 | Status: SHIPPED | OUTPATIENT
Start: 2019-01-17 | End: 2019-06-05 | Stop reason: SDUPTHER

## 2019-01-23 ENCOUNTER — OFFICE VISIT (OUTPATIENT)
Dept: PSYCHIATRY | Facility: CLINIC | Age: 54
End: 2019-01-23
Payer: COMMERCIAL

## 2019-01-23 VITALS
BODY MASS INDEX: 23.78 KG/M2 | DIASTOLIC BLOOD PRESSURE: 70 MMHG | HEIGHT: 65 IN | SYSTOLIC BLOOD PRESSURE: 119 MMHG | WEIGHT: 142.75 LBS | HEART RATE: 100 BPM

## 2019-01-23 DIAGNOSIS — F41.0 PANIC ATTACKS: ICD-10-CM

## 2019-01-23 DIAGNOSIS — F33.2 SEVERE EPISODE OF RECURRENT MAJOR DEPRESSIVE DISORDER, WITHOUT PSYCHOTIC FEATURES: Primary | ICD-10-CM

## 2019-01-23 DIAGNOSIS — F19.10 SUBSTANCE ABUSE: ICD-10-CM

## 2019-01-23 PROCEDURE — 3008F PR BODY MASS INDEX (BMI) DOCUMENTED: ICD-10-PCS | Mod: CPTII,S$GLB,, | Performed by: NURSE PRACTITIONER

## 2019-01-23 PROCEDURE — 3008F BODY MASS INDEX DOCD: CPT | Mod: CPTII,S$GLB,, | Performed by: NURSE PRACTITIONER

## 2019-01-23 PROCEDURE — 99212 PR OFFICE/OUTPT VISIT, EST, LEVL II, 10-19 MIN: ICD-10-PCS | Mod: S$GLB,,, | Performed by: NURSE PRACTITIONER

## 2019-01-23 PROCEDURE — 99212 OFFICE O/P EST SF 10 MIN: CPT | Mod: S$GLB,,, | Performed by: NURSE PRACTITIONER

## 2019-01-23 PROCEDURE — 99999 PR PBB SHADOW E&M-EST. PATIENT-LVL II: ICD-10-PCS | Mod: PBBFAC,,, | Performed by: NURSE PRACTITIONER

## 2019-01-23 PROCEDURE — 99999 PR PBB SHADOW E&M-EST. PATIENT-LVL II: CPT | Mod: PBBFAC,,, | Performed by: NURSE PRACTITIONER

## 2019-01-23 RX ORDER — GABAPENTIN 300 MG/1
300 CAPSULE ORAL 3 TIMES DAILY
Qty: 90 CAPSULE | Refills: 2 | Status: SHIPPED | OUTPATIENT
Start: 2019-01-23 | End: 2019-04-25 | Stop reason: SDUPTHER

## 2019-01-23 RX ORDER — TRAZODONE HYDROCHLORIDE 100 MG/1
100 TABLET ORAL NIGHTLY PRN
Qty: 30 TABLET | Refills: 2 | Status: SHIPPED | OUTPATIENT
Start: 2019-01-23 | End: 2019-04-25 | Stop reason: SDUPTHER

## 2019-01-23 RX ORDER — DULOXETIN HYDROCHLORIDE 20 MG/1
20 CAPSULE, DELAYED RELEASE ORAL 2 TIMES DAILY
Qty: 60 CAPSULE | Refills: 2 | Status: SHIPPED | OUTPATIENT
Start: 2019-01-23 | End: 2019-04-25 | Stop reason: SDUPTHER

## 2019-01-23 NOTE — PROGRESS NOTES
"Outpatient Psychiatry Follow-Up Visit (MD/NP)    1/23/2019    Clinical Status of Patient:  Outpatient (Ambulatory)    Chief Complaint:  Soledad Onofre is a 53 y.o. female who presents today for follow-up of depression and anxiety.  Met with patient.      Interval History and Content of Current Session:  Interim Events/Subjective Report/Content of Current Session:   Soledad Onofre arrived on time for her appointment. She is casually dressed with good hygiene and grooming. Since last visit she is "doing fantastic. Its been great." " Its a lot of pills but it works so that's the main thing." Hardly any anxiety every. Increase energy. Increase appetite. Feeling of guilt have improved greatly. Her friend even noticed how "much calmer" she is. One side effect is dry mouth. Sleep has been using Trazodone more but attributes it to saints loosing, financial stressors, health of parents. Has been trying to keep herself busy, helping others. Wants to get back into volunteering. Continues to drink "socially" - had a glass of wine at dinner the other night. Denies abuse or excessive use.    Psychotherapy:  · Target symptoms: alcohol abuse, depression, anxiety   · Why chosen therapy is appropriate versus another modality: relevant to diagnosis, patient responds to this modality, evidence based practice  · Outcome monitoring methods: self-report, observation  · Therapeutic intervention type: insight oriented psychotherapy, behavior modifying psychotherapy, supportive psychotherapy  · Topics discussed/themes: work stress, building skills sets for symptom management, symptom recognition  · The patient's response to the intervention is accepting. The patient's progress toward treatment goals is good.   · Duration of intervention: 16 minutes.    Review of Systems   · PSYCHIATRIC: Pertinant items are noted in the narrative.  · CONSTITUTIONAL: Positive for weight gain.   · MUSCULOSKELETAL: No pain or stiffness of the " "joints.  · NEUROLOGIC: No weakness, sensory changes, seizures, confusion, memory loss, tremor or other abnormal movements.  · ENDOCRINE: No polydipsia or polyuria.  · INTEGUMENTARY: No rashes or lacerations.  · EYES: No exophthalmos, jaundice or blindness.  · ENT: No dizziness, tinnitus or hearing loss.  · RESPIRATORY: No shortness of breath.  · CARDIOVASCULAR: No tachycardia or chest pain.  · GASTROINTESTINAL: No nausea, vomiting, pain, constipation or diarrhea.  · GENITOURINARY: No frequency, dysuria or sexual dysfunction.  · HEMATOLOGIC/LYMPHATIC: No excessive bleeding, prolonged or excessive bleeding after dental extraction/injury.  · ALLERGIC/IMMUNOLOGIC: No allergic response to materials, foods or animals at this time.    Past Medical, Family and Social History: The patient's past medical, family and social history have been reviewed and updated as appropriate within the electronic medical record - see encounter notes.    Compliance: yes    Side effects: see above    Risk Parameters:  Patient reports no suicidal ideation  Patient reports no homicidal ideation  Patient reports no self-injurious behavior  Patient reports no violent behavior    Exam (detailed: at least 9 elements; comprehensive: all 15 elements)   Constitutional  Vitals:  Most recent vital signs, dated less than 90 days prior to this appointment, were reviewed.   Vitals:    01/23/19 1501   BP: 119/70   Pulse: 100   Weight: 64.8 kg (142 lb 12 oz)   Height: 5' 5" (1.651 m)        General:  unremarkable, age appropriate     Musculoskeletal  Muscle Strength/Tone:  no tremor, no tic   Gait & Station:  non-ataxic     Psychiatric  Speech:  no latency; no press   Mood & Affect:  "content"  congruent and appropriate   Thought Process:  normal and logical   Associations:  intact   Thought Content:  normal, no suicidality, no homicidality, delusions, or paranoia   Insight:  intact   Judgement: behavior is adequate to circumstances   Orientation:  grossly " "intact   Memory: intact for content of interview   Language: grossly intact   Attention Span & Concentration:  able to focus   Fund of Knowledge:  intact and appropriate to age and level of education     Assessment and Diagnosis   Status/Progress: Based on the examination today, the patient's problem(s) is/are improved.  New problems have not been presented today.   Co-morbidities, Diagnostic uncertainty and Lack of compliance are not complicating management of the primary condition.  There are no active rule-out diagnoses for this patient at this time.     General Impression: Soledad Onofre is a 53 y.o. female who presents today for follow-up of depression and anxiety. Since last visit she is "doing fantastic. Its been great." " Its a lot of pills but it works so that's the main thing." Hardly any anxiety every. Increase energy. Increase appetite. Feeling of guilt have improved greatly. Has dry mouth and using trazodone more but attributes it to increase in stress recently. Denies SI/HI/AH/VH paranoia or delusions.Patient verbalized motivation for compliance with medications and all other elements of treatment plan.       ICD-10-CM ICD-9-CM   1. Severe episode of recurrent major depressive disorder, without psychotic features F33.2 296.33   2. Panic attacks F41.0 300.01   3. Substance abuse F19.10 305.90       Intervention/Counseling/Treatment Plan   · Medication Management: Continue current medications  ? Trazodone 100 mg by mouth nightly as needed  ? Cymbalta 20 mg BID  ? Neurontin 300 mg TID   ? The risks and benefits of medication were discussed with the patient.  · AA/NA/CA/ACOA/Abstinence  · The treatment plan and follow up plan were reviewed with the patient.  · Discussed diagnosis, risks and benefits of proposed treatment above vs alternative treatments vs no treatment, and potential side effects of these treatments. The patient expresses understanding of the above and displays the capacity to agree " with this treatment given said understanding. Patient also agrees that, currently, the benefits outweigh the risks and would like to pursue treatment at this time.  · Encouraged Patient to keep future appointments.   · Take medications as prescribed and abstain from substance abuse.   · In the event of an emergency patient was advised to go to the emergency room    Return to Clinic: 3 months or sooner    Meera Diaz DNP

## 2019-03-31 DIAGNOSIS — Z78.0 MENOPAUSE: ICD-10-CM

## 2019-03-31 RX ORDER — ESTRADIOL 1 MG/1
TABLET ORAL
Qty: 90 TABLET | Refills: 0 | Status: SHIPPED | OUTPATIENT
Start: 2019-03-31 | End: 2019-06-05 | Stop reason: SDUPTHER

## 2019-04-25 RX ORDER — GABAPENTIN 300 MG/1
CAPSULE ORAL
Qty: 90 CAPSULE | Refills: 0 | Status: SHIPPED | OUTPATIENT
Start: 2019-04-25 | End: 2019-05-27 | Stop reason: SDUPTHER

## 2019-04-25 RX ORDER — TRAZODONE HYDROCHLORIDE 100 MG/1
TABLET ORAL
Qty: 30 TABLET | Refills: 0 | Status: SHIPPED | OUTPATIENT
Start: 2019-04-25 | End: 2019-05-27 | Stop reason: SDUPTHER

## 2019-04-25 RX ORDER — DULOXETIN HYDROCHLORIDE 20 MG/1
CAPSULE, DELAYED RELEASE ORAL
Qty: 60 CAPSULE | Refills: 0 | Status: SHIPPED | OUTPATIENT
Start: 2019-04-25 | End: 2019-05-27 | Stop reason: SDUPTHER

## 2019-05-27 RX ORDER — GABAPENTIN 300 MG/1
CAPSULE ORAL
Qty: 90 CAPSULE | Refills: 0 | Status: SHIPPED | OUTPATIENT
Start: 2019-05-27 | End: 2019-06-27 | Stop reason: SDUPTHER

## 2019-05-27 RX ORDER — DULOXETIN HYDROCHLORIDE 20 MG/1
CAPSULE, DELAYED RELEASE ORAL
Qty: 60 CAPSULE | Refills: 0 | Status: SHIPPED | OUTPATIENT
Start: 2019-05-27 | End: 2019-06-27 | Stop reason: SDUPTHER

## 2019-05-27 RX ORDER — TRAZODONE HYDROCHLORIDE 100 MG/1
TABLET ORAL
Qty: 30 TABLET | Refills: 0 | Status: SHIPPED | OUTPATIENT
Start: 2019-05-27 | End: 2019-06-27 | Stop reason: SDUPTHER

## 2019-05-30 ENCOUNTER — TELEPHONE (OUTPATIENT)
Dept: OBSTETRICS AND GYNECOLOGY | Facility: CLINIC | Age: 54
End: 2019-05-30

## 2019-05-30 NOTE — TELEPHONE ENCOUNTER
Appointment Request From: Soledad Onofre      With Provider: Contreras Cabrera MD [[facility]]      Preferred Date Range: 6/3/2019 - 6/24/2019      Preferred Times: Monday Afternoon      Reason for visit: yearly visit      Comments:   To check yearly visit and my concern is that I am getting my period for the last 2 months. I thought I was past menopause.

## 2019-06-05 ENCOUNTER — OFFICE VISIT (OUTPATIENT)
Dept: OBSTETRICS AND GYNECOLOGY | Facility: CLINIC | Age: 54
End: 2019-06-05
Payer: COMMERCIAL

## 2019-06-05 VITALS
BODY MASS INDEX: 24.02 KG/M2 | SYSTOLIC BLOOD PRESSURE: 102 MMHG | WEIGHT: 144.19 LBS | HEIGHT: 65 IN | DIASTOLIC BLOOD PRESSURE: 62 MMHG

## 2019-06-05 DIAGNOSIS — N92.6 IRREGULAR UTERINE BLEEDING: ICD-10-CM

## 2019-06-05 DIAGNOSIS — Z01.419 WELL WOMAN EXAM WITH ROUTINE GYNECOLOGICAL EXAM: Primary | ICD-10-CM

## 2019-06-05 DIAGNOSIS — Z78.0 MENOPAUSE: ICD-10-CM

## 2019-06-05 PROCEDURE — 99396 PR PREVENTIVE VISIT,EST,40-64: ICD-10-PCS | Mod: S$GLB,,, | Performed by: OBSTETRICS & GYNECOLOGY

## 2019-06-05 PROCEDURE — 99396 PREV VISIT EST AGE 40-64: CPT | Mod: S$GLB,,, | Performed by: OBSTETRICS & GYNECOLOGY

## 2019-06-05 PROCEDURE — 88175 CYTOPATH C/V AUTO FLUID REDO: CPT

## 2019-06-05 PROCEDURE — 99999 PR PBB SHADOW E&M-EST. PATIENT-LVL III: CPT | Mod: PBBFAC,,, | Performed by: OBSTETRICS & GYNECOLOGY

## 2019-06-05 PROCEDURE — 99999 PR PBB SHADOW E&M-EST. PATIENT-LVL III: ICD-10-PCS | Mod: PBBFAC,,, | Performed by: OBSTETRICS & GYNECOLOGY

## 2019-06-05 RX ORDER — ACETAMINOPHEN AND CODEINE PHOSPHATE 120; 12 MG/5ML; MG/5ML
1 SOLUTION ORAL DAILY
Qty: 84 TABLET | Refills: 4 | Status: SHIPPED | OUTPATIENT
Start: 2019-06-05 | End: 2020-08-26

## 2019-06-05 RX ORDER — MEDROXYPROGESTERONE ACETATE 10 MG/1
TABLET ORAL
Qty: 10 TABLET | Refills: 0 | Status: SHIPPED | OUTPATIENT
Start: 2019-06-05 | End: 2022-01-12

## 2019-06-05 RX ORDER — ESTRADIOL 1 MG/1
1 TABLET ORAL DAILY
Qty: 90 TABLET | Refills: 4 | Status: SHIPPED | OUTPATIENT
Start: 2019-06-05 | End: 2020-08-31 | Stop reason: SDUPTHER

## 2019-06-05 NOTE — PROGRESS NOTES
Subjective:       Patient ID: Soledad Onofre is a 54 y.o. female.    Chief Complaint: Well Woman (annual and she is having irregular cycles)    Earlier this year the patient has stopped her Micronor for a while.  She continued her estradiol.  She has recently been having irregular bleeding which is dark old blood.  Plan is made to adjust hormone replacement therapy and correct irregular bleeding.    HPI  Review of Systems   Gastrointestinal: Negative for abdominal distention, abdominal pain, constipation and nausea.   Genitourinary: Negative for dyspareunia, dysuria, genital sores, pelvic pain, vaginal bleeding and vaginal discharge.       Objective:      Physical Exam   Constitutional: She appears well-developed and well-nourished.   Pulmonary/Chest: Right breast exhibits no mass, no nipple discharge, no skin change and no tenderness. Left breast exhibits no mass, no nipple discharge, no skin change and no tenderness. No breast tenderness or discharge.   Abdominal: Soft. Bowel sounds are normal. She exhibits no distension and no mass. There is no tenderness. There is no rebound and no guarding. Hernia confirmed negative in the right inguinal area and confirmed negative in the left inguinal area.   Genitourinary: Rectum normal, vagina normal and uterus normal. No breast tenderness or discharge. There is no lesion on the right labia. There is no lesion on the left labia. Uterus is not fixed and not tender. Cervix exhibits no motion tenderness, no discharge and no friability. Right adnexum displays no mass, no tenderness and no fullness. Left adnexum displays no mass, no tenderness and no fullness. No tenderness in the vagina. No vaginal discharge found.   Lymphadenopathy:        Right: No inguinal adenopathy present.        Left: No inguinal adenopathy present.       Normal appearing cervix with old dark blood present.  No abnormalities notable.  Assessment:       1. Well woman exam with routine gynecological  exam    2. Irregular uterine bleeding    3. Menopause        Plan:        annual gyn visit with Pap and mammogram.    Continue hormone replacement but plan will be to add Provera 10 mg for 10 days to the Micronor 0.35.  Also estradiol 1 mg will be doubled up to a 2 mg dose for the same 10 days.  After the Provera and other pills are done for 10 days, patient will take a 5 day no pill interval to allow a withdrawal bleed.  After that she will resume her normal 1 mg estradiol and 1 Micronor tablet daily.  If bleeding continues or recurs, patient will call to set up an ultrasound possible endometrial biopsy with other treatment such as D and C with endometrial ablation if needed.

## 2019-06-07 ENCOUNTER — HOSPITAL ENCOUNTER (OUTPATIENT)
Dept: RADIOLOGY | Facility: HOSPITAL | Age: 54
Discharge: HOME | End: 2019-06-07
Attending: OBSTETRICS & GYNECOLOGY
Payer: COMMERCIAL

## 2019-06-07 DIAGNOSIS — Z01.419 WELL WOMAN EXAM WITH ROUTINE GYNECOLOGICAL EXAM: ICD-10-CM

## 2019-06-07 PROCEDURE — 77067 SCR MAMMO BI INCL CAD: CPT | Mod: TC

## 2019-06-07 PROCEDURE — 77063 BREAST TOMOSYNTHESIS BI: CPT | Mod: 26,,, | Performed by: RADIOLOGY

## 2019-06-07 PROCEDURE — 77063 MAMMO DIGITAL SCREENING BILAT WITH TOMOSYNTHESIS_CAD: ICD-10-PCS | Mod: 26,,, | Performed by: RADIOLOGY

## 2019-06-07 PROCEDURE — 77067 MAMMO DIGITAL SCREENING BILAT WITH TOMOSYNTHESIS_CAD: ICD-10-PCS | Mod: 26,,, | Performed by: RADIOLOGY

## 2019-06-07 PROCEDURE — 77067 SCR MAMMO BI INCL CAD: CPT | Mod: 26,,, | Performed by: RADIOLOGY

## 2019-06-27 RX ORDER — GABAPENTIN 300 MG/1
CAPSULE ORAL
Qty: 90 CAPSULE | Refills: 0 | Status: SHIPPED | OUTPATIENT
Start: 2019-06-27 | End: 2019-09-09 | Stop reason: SDUPTHER

## 2019-06-27 RX ORDER — DULOXETIN HYDROCHLORIDE 20 MG/1
CAPSULE, DELAYED RELEASE ORAL
Qty: 60 CAPSULE | Refills: 0 | Status: SHIPPED | OUTPATIENT
Start: 2019-06-27 | End: 2019-08-16 | Stop reason: SDUPTHER

## 2019-06-27 RX ORDER — TRAZODONE HYDROCHLORIDE 100 MG/1
TABLET ORAL
Qty: 30 TABLET | Refills: 0 | Status: SHIPPED | OUTPATIENT
Start: 2019-06-27 | End: 2019-09-09 | Stop reason: SDUPTHER

## 2019-07-24 ENCOUNTER — TELEPHONE (OUTPATIENT)
Dept: OBSTETRICS AND GYNECOLOGY | Facility: CLINIC | Age: 54
End: 2019-07-24

## 2019-07-24 NOTE — LETTER
July 24, 2019    Soledad Onofre  [address] [id number]             [facility]  [address]  [place] [address]  Phone: [phone] Dear Ms. Onofre:    The results of your most recent Pap smear are normal. This means that no cancerous or precancerous cells were seen. We recommend that you come back in 1 year for your annual exam.    If you have any questions or concerns, please don't hesitate to call.    Sincerely,        Contreras Terry MD

## 2019-08-16 RX ORDER — DULOXETIN HYDROCHLORIDE 20 MG/1
CAPSULE, DELAYED RELEASE ORAL
Qty: 60 CAPSULE | Refills: 0 | Status: SHIPPED | OUTPATIENT
Start: 2019-08-16 | End: 2019-10-09 | Stop reason: SDUPTHER

## 2019-09-09 ENCOUNTER — PATIENT MESSAGE (OUTPATIENT)
Dept: PSYCHIATRY | Facility: CLINIC | Age: 54
End: 2019-09-09

## 2019-09-09 RX ORDER — TRAZODONE HYDROCHLORIDE 100 MG/1
TABLET ORAL
Qty: 30 TABLET | Refills: 0 | Status: SHIPPED | OUTPATIENT
Start: 2019-09-09 | End: 2019-10-07 | Stop reason: SDUPTHER

## 2019-09-09 RX ORDER — GABAPENTIN 300 MG/1
CAPSULE ORAL
Qty: 90 CAPSULE | Refills: 0 | Status: SHIPPED | OUTPATIENT
Start: 2019-09-09 | End: 2019-10-07 | Stop reason: SDUPTHER

## 2019-09-16 ENCOUNTER — PATIENT MESSAGE (OUTPATIENT)
Dept: PSYCHIATRY | Facility: HOSPITAL | Age: 54
End: 2019-09-16

## 2019-09-18 RX ORDER — DULOXETIN HYDROCHLORIDE 20 MG/1
CAPSULE, DELAYED RELEASE ORAL
Qty: 60 CAPSULE | Refills: 0 | OUTPATIENT
Start: 2019-09-18

## 2019-10-07 ENCOUNTER — PATIENT MESSAGE (OUTPATIENT)
Dept: PSYCHIATRY | Facility: HOSPITAL | Age: 54
End: 2019-10-07

## 2019-10-07 RX ORDER — TRAZODONE HYDROCHLORIDE 100 MG/1
TABLET ORAL
Qty: 30 TABLET | Refills: 0 | Status: SHIPPED | OUTPATIENT
Start: 2019-10-07 | End: 2019-11-08 | Stop reason: SDUPTHER

## 2019-10-07 RX ORDER — GABAPENTIN 300 MG/1
CAPSULE ORAL
Qty: 90 CAPSULE | Refills: 0 | Status: SHIPPED | OUTPATIENT
Start: 2019-10-07 | End: 2019-11-08 | Stop reason: SDUPTHER

## 2019-10-09 RX ORDER — DULOXETIN HYDROCHLORIDE 20 MG/1
CAPSULE, DELAYED RELEASE ORAL
Qty: 60 CAPSULE | Refills: 0 | Status: SHIPPED | OUTPATIENT
Start: 2019-10-09 | End: 2019-11-08 | Stop reason: SDUPTHER

## 2019-11-08 RX ORDER — DULOXETIN HYDROCHLORIDE 20 MG/1
CAPSULE, DELAYED RELEASE ORAL
Qty: 60 CAPSULE | Refills: 0 | Status: SHIPPED | OUTPATIENT
Start: 2019-11-08 | End: 2020-03-09 | Stop reason: SDUPTHER

## 2019-11-08 RX ORDER — TRAZODONE HYDROCHLORIDE 100 MG/1
TABLET ORAL
Qty: 30 TABLET | Refills: 0 | Status: SHIPPED | OUTPATIENT
Start: 2019-11-08 | End: 2020-02-22

## 2019-11-08 RX ORDER — GABAPENTIN 300 MG/1
CAPSULE ORAL
Qty: 90 CAPSULE | Refills: 0 | Status: SHIPPED | OUTPATIENT
Start: 2019-11-08 | End: 2020-03-20 | Stop reason: SDUPTHER

## 2019-11-15 DIAGNOSIS — Z12.11 COLON CANCER SCREENING: ICD-10-CM

## 2019-11-25 PROCEDURE — G0180 PR HOME HEALTH MD CERTIFICATION: ICD-10-PCS | Mod: ,,, | Performed by: PHYSICAL MEDICINE & REHABILITATION

## 2019-11-25 PROCEDURE — G0180 MD CERTIFICATION HHA PATIENT: HCPCS | Mod: ,,, | Performed by: PHYSICAL MEDICINE & REHABILITATION

## 2019-12-03 ENCOUNTER — EXTERNAL HOME HEALTH (OUTPATIENT)
Dept: HOME HEALTH SERVICES | Facility: HOSPITAL | Age: 54
End: 2019-12-03
Payer: COMMERCIAL

## 2019-12-11 ENCOUNTER — PATIENT MESSAGE (OUTPATIENT)
Dept: INTERNAL MEDICINE | Facility: CLINIC | Age: 54
End: 2019-12-11

## 2019-12-11 ENCOUNTER — PATIENT MESSAGE (OUTPATIENT)
Dept: ADMINISTRATIVE | Facility: HOSPITAL | Age: 54
End: 2019-12-11

## 2019-12-27 ENCOUNTER — OFFICE VISIT (OUTPATIENT)
Dept: INTERNAL MEDICINE | Facility: CLINIC | Age: 54
End: 2019-12-27
Payer: COMMERCIAL

## 2019-12-27 VITALS
BODY MASS INDEX: 23.4 KG/M2 | DIASTOLIC BLOOD PRESSURE: 56 MMHG | WEIGHT: 140.44 LBS | HEART RATE: 91 BPM | TEMPERATURE: 98 F | SYSTOLIC BLOOD PRESSURE: 116 MMHG | HEIGHT: 65 IN | RESPIRATION RATE: 18 BRPM

## 2019-12-27 DIAGNOSIS — S32.9XXS CLOSED DISPLACED FRACTURE OF PELVIS, UNSPECIFIED PART OF PELVIS, SEQUELA: Primary | ICD-10-CM

## 2019-12-27 PROCEDURE — 90686 IIV4 VACC NO PRSV 0.5 ML IM: CPT | Mod: S$GLB,,, | Performed by: INTERNAL MEDICINE

## 2019-12-27 PROCEDURE — 99213 OFFICE O/P EST LOW 20 MIN: CPT | Mod: 25,S$GLB,, | Performed by: INTERNAL MEDICINE

## 2019-12-27 PROCEDURE — 99213 PR OFFICE/OUTPT VISIT, EST, LEVL III, 20-29 MIN: ICD-10-PCS | Mod: 25,S$GLB,, | Performed by: INTERNAL MEDICINE

## 2019-12-27 PROCEDURE — 99999 PR PBB SHADOW E&M-EST. PATIENT-LVL IV: ICD-10-PCS | Mod: PBBFAC,,, | Performed by: INTERNAL MEDICINE

## 2019-12-27 PROCEDURE — 90471 IMMUNIZATION ADMIN: CPT | Mod: S$GLB,,, | Performed by: INTERNAL MEDICINE

## 2019-12-27 PROCEDURE — 90686 FLU VACCINE (QUAD) GREATER THAN OR EQUAL TO 3YO PRESERVATIVE FREE IM: ICD-10-PCS | Mod: S$GLB,,, | Performed by: INTERNAL MEDICINE

## 2019-12-27 PROCEDURE — 90471 FLU VACCINE (QUAD) GREATER THAN OR EQUAL TO 3YO PRESERVATIVE FREE IM: ICD-10-PCS | Mod: S$GLB,,, | Performed by: INTERNAL MEDICINE

## 2019-12-27 PROCEDURE — 3008F BODY MASS INDEX DOCD: CPT | Mod: CPTII,S$GLB,, | Performed by: INTERNAL MEDICINE

## 2019-12-27 PROCEDURE — 99999 PR PBB SHADOW E&M-EST. PATIENT-LVL IV: CPT | Mod: PBBFAC,,, | Performed by: INTERNAL MEDICINE

## 2019-12-27 PROCEDURE — 3008F PR BODY MASS INDEX (BMI) DOCUMENTED: ICD-10-PCS | Mod: CPTII,S$GLB,, | Performed by: INTERNAL MEDICINE

## 2019-12-27 RX ORDER — METHOCARBAMOL 750 MG/1
TABLET, FILM COATED ORAL
Refills: 0 | COMMUNITY
Start: 2019-11-22 | End: 2020-05-14

## 2019-12-27 RX ORDER — OXYCODONE HYDROCHLORIDE 10 MG/1
10 TABLET ORAL EVERY 8 HOURS PRN
Qty: 30 TABLET | Refills: 0 | Status: SHIPPED | OUTPATIENT
Start: 2019-12-27 | End: 2020-05-14

## 2019-12-27 RX ORDER — FERROUS SULFATE, DRIED 160(50) MG
1 TABLET, EXTENDED RELEASE ORAL 2 TIMES DAILY WITH MEALS
COMMUNITY

## 2019-12-27 RX ORDER — CHOLECALCIFEROL (VITAMIN D3) 25 MCG
1000 TABLET ORAL DAILY
COMMUNITY

## 2019-12-27 RX ORDER — SENNOSIDES 8.6 MG/1
17.2 TABLET ORAL
COMMUNITY
Start: 2019-11-22 | End: 2022-01-12

## 2019-12-27 RX ORDER — OXYCODONE HYDROCHLORIDE 10 MG/1
TABLET ORAL
Refills: 0 | COMMUNITY
Start: 2019-11-22 | End: 2019-12-27 | Stop reason: SDUPTHER
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
## 2020-01-08 RX ORDER — GABAPENTIN 300 MG/1
CAPSULE ORAL
Qty: 90 CAPSULE | Refills: 0 | OUTPATIENT
Start: 2020-01-08

## 2020-01-08 RX ORDER — TRAZODONE HYDROCHLORIDE 100 MG/1
TABLET ORAL
Qty: 30 TABLET | Refills: 0 | OUTPATIENT
Start: 2020-01-08

## 2020-01-08 RX ORDER — DULOXETIN HYDROCHLORIDE 20 MG/1
CAPSULE, DELAYED RELEASE ORAL
Qty: 60 CAPSULE | Refills: 0 | OUTPATIENT
Start: 2020-01-08

## 2020-01-14 ENCOUNTER — CLINICAL SUPPORT (OUTPATIENT)
Dept: REHABILITATION | Facility: HOSPITAL | Age: 55
End: 2020-01-14
Attending: INTERNAL MEDICINE
Payer: COMMERCIAL

## 2020-01-14 DIAGNOSIS — R26.89 ANTALGIC GAIT: ICD-10-CM

## 2020-01-14 DIAGNOSIS — M25.60 DECREASED RANGE OF MOTION: ICD-10-CM

## 2020-01-14 DIAGNOSIS — M25.551 HIP PAIN, BILATERAL: ICD-10-CM

## 2020-01-14 DIAGNOSIS — M25.552 HIP PAIN, BILATERAL: ICD-10-CM

## 2020-01-14 DIAGNOSIS — R29.898 WEAKNESS OF BOTH LOWER EXTREMITIES: ICD-10-CM

## 2020-01-14 PROCEDURE — 97162 PT EVAL MOD COMPLEX 30 MIN: CPT | Mod: PO
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
## 2020-01-27 NOTE — PROGRESS NOTES
"   Physical Therapy Daily Treatment Note     Name: Soledad Onofre  Clinic Number: [id number]    Therapy Diagnosis:   Encounter Diagnoses   Name Primary?    Weakness of both lower extremities     Hip pain, bilateral     Decreased range of motion     Antalgic gait      Physician: Grace Casillas MD    Visit Date: 1/29/2020    Physician Orders: PT Eval and Treat   Medical Diagnosis from Referral: Closed displaced fracture of pelvis, unspecified part of pelvis, sequela (S32.9XXS)  Evaluation Date: 1/14/2020  Authorization Period Expiration: 12/31/2020  Plan of Care Certification Period: 01/14/2020 - 04/03/2020  Visit #/Visits authorized: 2/ 30     Time In: 10:00 am  Time Out: 11:00 am  Total Billable Time: 30 minutes    Precautions: Standard and Fall    Subjective     Pt reports: she had to cancel the other visits because she was "not having a good week mentally".  She states she has begun walking her dogs this week and has noticed she has been feeling SOB while walking from bed to kitchen and short household distances as well as while walking her dogs.     She was not compliant with home exercise program - one was not provided at .  Response to previous treatment: good, no adverse effects  Functional change: none    Pain: 1/10  Location: bilateral hips/groin/pelvis with R>L      Objective     Soledad received therapeutic exercises to develop strength, endurance, ROM, flexibility, posture and core stabilization for 55 minutes including:    Bike 10 minutes level 2  PPT 20x 3 second hold  Adductor ball squeeze 20x 5 second hold  Bridge with ball squeeze and PPT 2x 10  Bridge with PPT and GTB 2x 10  Clamshells GTB 2x 10  TrA activation 20x 3 second hold  Diaphragmatic breathing x10  Single leg fall out with GTB 1x 10        Home Exercises Provided and Patient Education Provided     Education provided:   - exercises  -core/muscle activation  -HEP   - SOB due to inactivity/decreased endurance    Written Home " Exercises Provided: yes.  Exercises were reviewed and Soledad was able to demonstrate them prior to the end of the session.  Soledad demonstrated good  understanding of the education provided.     See EMR under Patient Instructions for exercises provided 1/21/2020.    Assessment     Patient did well with all exercises at initial treatment session today.  She ambulated into clinic without SPC.  She denied increase in pain but reported difficulty with exercises due to muscle weakness and fatigue with patient.  Patient was instructed in diaphragmatic breathing exercise to help with breathing as patient reported feeling SOB getting up and walking from bed to kitchen to get medicine and overall feeling SOB recently. Continue with strengthening for improved endurance and functional mobility.     Soledad is progressing well towards her goals.   Pt prognosis is Good.     Pt will continue to benefit from skilled outpatient physical therapy to address the deficits listed in the problem list box on initial evaluation, provide pt/family education and to maximize pt's level of independence in the home and community environment.     Pt's spiritual, cultural and educational needs considered and pt agreeable to plan of care and goals.     Anticipated barriers to physical therapy: pelvic fx, co-morbidities, mental health status per pt reports    Goals:   In 5 weeks,    Goal Status   1. Patient will be independent with HEP to promote improved therapy outcomes.  STG     2. Patient will perform palloff press with good control to demonstrate improved core strength STG     3. Patient will improve B LE MMT to 3+/5 to demonstrate improved strength for functional tasks.  STG     4. Patient will improve lumbar ROM by 5 degrees to improve functional mobility.  STG     5. Patient will ambulate 400 ft without AD or LOB and <2/10 pain to improve community and household ambulation distances. STG     6. Patient will get into/out of a car with <2/10 pain  for improved community involvement.              In 10 weeks,   Goal Status   7. Patient will be independent with progressed HEP to self manage symptoms.  LTG     8. Patient will improve B LE MMT to 4+/5 to improve strength for functional tasks.  LTG     9. Patient will report walking for 30 minutes with <2/10 pain 5x/week to promote healthy lifestyle and regular physical exercise.  LTG     10. Patient will improve R hip flexion by 20' with <2/10 pain to promote improve gait mechanics.  LTG     11. Patient will be able to kneel and bend for 1 hour to garden at home for return to leisure activities.  LTG     12. Patient will perform errands without use of AD or LOB and <2/10 pain to return to PLOF.  LTG     13. Patient will improve lumbar ROM by 10 degrees to improve functional mobility.  LTG          Plan     Continue with established PT POC and progress per pt tolerance.      Milly England, PT

## 2020-01-29 ENCOUNTER — CLINICAL SUPPORT (OUTPATIENT)
Dept: REHABILITATION | Facility: HOSPITAL | Age: 55
End: 2020-01-29
Attending: INTERNAL MEDICINE
Payer: COMMERCIAL

## 2020-01-29 DIAGNOSIS — M25.552 HIP PAIN, BILATERAL: ICD-10-CM

## 2020-01-29 DIAGNOSIS — M25.551 HIP PAIN, BILATERAL: ICD-10-CM

## 2020-01-29 DIAGNOSIS — R26.89 ANTALGIC GAIT: ICD-10-CM

## 2020-01-29 DIAGNOSIS — M25.60 DECREASED RANGE OF MOTION: ICD-10-CM

## 2020-01-29 DIAGNOSIS — R29.898 WEAKNESS OF BOTH LOWER EXTREMITIES: ICD-10-CM

## 2020-01-29 PROCEDURE — 97110 THERAPEUTIC EXERCISES: CPT | Mod: PO

## 2020-02-22 RX ORDER — TRAZODONE HYDROCHLORIDE 100 MG/1
TABLET ORAL
Qty: 30 TABLET | Refills: 0 | OUTPATIENT
Start: 2020-02-22 | End: 2020-03-20 | Stop reason: SDUPTHER

## 2020-02-22 RX ORDER — FLUTICASONE PROPIONATE 50 MCG
SPRAY, SUSPENSION (ML) NASAL
Qty: 16 G | OUTPATIENT
Start: 2020-02-22

## 2020-03-07 ENCOUNTER — PATIENT MESSAGE (OUTPATIENT)
Dept: PSYCHIATRY | Facility: CLINIC | Age: 55
End: 2020-03-07

## 2020-03-09 RX ORDER — DULOXETIN HYDROCHLORIDE 20 MG/1
CAPSULE, DELAYED RELEASE ORAL
Qty: 60 CAPSULE | Refills: 0 | Status: SHIPPED | OUTPATIENT
Start: 2020-03-09 | End: 2020-05-01 | Stop reason: SDUPTHER

## 2020-03-20 ENCOUNTER — PATIENT MESSAGE (OUTPATIENT)
Dept: INTERNAL MEDICINE | Facility: CLINIC | Age: 55
End: 2020-03-20

## 2020-03-20 RX ORDER — GABAPENTIN 300 MG/1
CAPSULE ORAL
Qty: 90 CAPSULE | Refills: 0 | Status: SHIPPED | OUTPATIENT
Start: 2020-03-20 | End: 2020-05-01 | Stop reason: SDUPTHER

## 2020-03-21 RX ORDER — TRAZODONE HYDROCHLORIDE 100 MG/1
TABLET ORAL
Qty: 30 TABLET | Refills: 3 | Status: SHIPPED | OUTPATIENT
Start: 2020-03-21 | End: 2020-03-21 | Stop reason: SDUPTHER

## 2020-03-21 RX ORDER — TRAZODONE HYDROCHLORIDE 100 MG/1
TABLET ORAL
Qty: 30 TABLET | Refills: 3 | Status: SHIPPED | OUTPATIENT
Start: 2020-03-21 | End: 2020-05-01 | Stop reason: SDUPTHER

## 2020-03-27 ENCOUNTER — TELEPHONE (OUTPATIENT)
Dept: REHABILITATION | Facility: HOSPITAL | Age: 55
End: 2020-03-27

## 2020-03-27 ENCOUNTER — DOCUMENTATION ONLY (OUTPATIENT)
Dept: REHABILITATION | Facility: HOSPITAL | Age: 55
End: 2020-03-27

## 2020-03-27 PROBLEM — R26.89 ANTALGIC GAIT: Status: RESOLVED | Noted: 2020-01-14 | Resolved: 2020-03-27

## 2020-03-27 PROBLEM — M25.60 DECREASED RANGE OF MOTION: Status: RESOLVED | Noted: 2020-01-14 | Resolved: 2020-03-27

## 2020-03-27 PROBLEM — M25.552 HIP PAIN, BILATERAL: Status: RESOLVED | Noted: 2020-01-14 | Resolved: 2020-03-27

## 2020-03-27 PROBLEM — M25.551 HIP PAIN, BILATERAL: Status: RESOLVED | Noted: 2020-01-14 | Resolved: 2020-03-27

## 2020-03-27 PROBLEM — R29.898 WEAKNESS OF BOTH LOWER EXTREMITIES: Status: RESOLVED | Noted: 2020-01-14 | Resolved: 2020-03-27

## 2020-03-27 NOTE — PROGRESS NOTES
Outpatient Therapy Discharge Summary     Name: Soledad Onofre  Clinic Number: [id number]    Therapy Diagnosis:        Encounter Diagnoses   Name Primary?    Weakness of both lower extremities      Hip pain, bilateral      Decreased range of motion      Antalgic gait        Physician: Grace Casillas MD    Physician Orders: PT Eval and Treat   Medical Diagnosis from Referral: Closed displaced fracture of pelvis, unspecified part of pelvis, sequela (S32.9XXS)  Evaluation Date: 1/14/2020    Date of Last visit: 1/29/20  Total Visits Received: 2  Cancelled Visits: 5  No Show Visits: 3    Assessment    Goals:     Unable to re-assess for d/c status due to non-visit.     In 5 weeks,    Goal Status   1. Patient will be independent with HEP to promote improved therapy outcomes.  STG     2. Patient will perform palloff press with good control to demonstrate improved core strength STG     3. Patient will improve B LE MMT to 3+/5 to demonstrate improved strength for functional tasks.  STG     4. Patient will improve lumbar ROM by 5 degrees to improve functional mobility.  STG     5. Patient will ambulate 400 ft without AD or LOB and <2/10 pain to improve community and household ambulation distances. STG     6. Patient will get into/out of a car with <2/10 pain for improved community involvement.              In 10 weeks,   Goal Status   7. Patient will be independent with progressed HEP to self manage symptoms.  LTG     8. Patient will improve B LE MMT to 4+/5 to improve strength for functional tasks.  LTG     9. Patient will report walking for 30 minutes with <2/10 pain 5x/week to promote healthy lifestyle and regular physical exercise.  LTG     10. Patient will improve R hip flexion by 20' with <2/10 pain to promote improve gait mechanics.  LTG     11. Patient will be able to kneel and bend for 1 hour to garden at home for return to leisure activities.  LTG     12. Patient will perform errands without use of AD or  LOB and <2/10 pain to return to PLOF.  LTG     13. Patient will improve lumbar ROM by 10 degrees to improve functional mobility.  LTG            Discharge reason: Patient has not attended therapy since 1/30/20 and was contacted regarding d/c status due to non-compliance with POC.     Plan   This patient is discharged from Physical Therapy

## 2020-04-29 ENCOUNTER — PATIENT MESSAGE (OUTPATIENT)
Dept: PSYCHIATRY | Facility: CLINIC | Age: 55
End: 2020-04-29

## 2020-05-01 ENCOUNTER — OFFICE VISIT (OUTPATIENT)
Dept: PSYCHIATRY | Facility: CLINIC | Age: 55
End: 2020-05-01
Payer: COMMERCIAL

## 2020-05-01 DIAGNOSIS — F33.2 SEVERE EPISODE OF RECURRENT MAJOR DEPRESSIVE DISORDER, WITHOUT PSYCHOTIC FEATURES: Primary | ICD-10-CM

## 2020-05-01 DIAGNOSIS — F41.0 PANIC ATTACKS: ICD-10-CM

## 2020-05-01 PROCEDURE — 99213 PR OFFICE/OUTPT VISIT, EST, LEVL III, 20-29 MIN: ICD-10-PCS | Mod: 95,,, | Performed by: NURSE PRACTITIONER

## 2020-05-01 PROCEDURE — 99213 OFFICE O/P EST LOW 20 MIN: CPT | Mod: 95,,, | Performed by: NURSE PRACTITIONER

## 2020-05-01 RX ORDER — DULOXETIN HYDROCHLORIDE 60 MG/1
60 CAPSULE, DELAYED RELEASE ORAL DAILY
Qty: 90 CAPSULE | Refills: 0 | Status: SHIPPED | OUTPATIENT
Start: 2020-05-01 | End: 2020-08-06 | Stop reason: SDUPTHER

## 2020-05-01 RX ORDER — TRAZODONE HYDROCHLORIDE 100 MG/1
TABLET ORAL
Qty: 30 TABLET | Refills: 3 | Status: SHIPPED | OUTPATIENT
Start: 2020-05-01 | End: 2020-11-02

## 2020-05-01 RX ORDER — GABAPENTIN 300 MG/1
CAPSULE ORAL
Qty: 120 CAPSULE | Refills: 2 | Status: SHIPPED | OUTPATIENT
Start: 2020-05-01 | End: 2020-08-06 | Stop reason: SDUPTHER
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
## 2020-05-14 ENCOUNTER — OFFICE VISIT (OUTPATIENT)
Dept: INTERNAL MEDICINE | Facility: CLINIC | Age: 55
End: 2020-05-14
Payer: COMMERCIAL

## 2020-05-14 ENCOUNTER — LAB VISIT (OUTPATIENT)
Dept: LAB | Facility: HOSPITAL | Age: 55
End: 2020-05-14
Attending: INTERNAL MEDICINE
Payer: COMMERCIAL

## 2020-05-14 VITALS
TEMPERATURE: 99 F | DIASTOLIC BLOOD PRESSURE: 74 MMHG | HEART RATE: 99 BPM | BODY MASS INDEX: 26.05 KG/M2 | HEIGHT: 64 IN | RESPIRATION RATE: 18 BRPM | WEIGHT: 152.56 LBS | SYSTOLIC BLOOD PRESSURE: 110 MMHG | OXYGEN SATURATION: 98 %

## 2020-05-14 DIAGNOSIS — S32.9XXS CLOSED DISPLACED FRACTURE OF PELVIS, UNSPECIFIED PART OF PELVIS, SEQUELA: Primary | ICD-10-CM

## 2020-05-14 DIAGNOSIS — Z20.822 EXPOSURE TO COVID-19 VIRUS: ICD-10-CM

## 2020-05-14 LAB — SARS-COV-2 IGG SERPLBLD QL IA.RAPID: NEGATIVE

## 2020-05-14 PROCEDURE — 86769 SARS-COV-2 COVID-19 ANTIBODY: CPT

## 2020-05-14 PROCEDURE — 3008F BODY MASS INDEX DOCD: CPT | Mod: CPTII,S$GLB,, | Performed by: INTERNAL MEDICINE

## 2020-05-14 PROCEDURE — 99213 PR OFFICE/OUTPT VISIT, EST, LEVL III, 20-29 MIN: ICD-10-PCS | Mod: S$GLB,,, | Performed by: INTERNAL MEDICINE

## 2020-05-14 PROCEDURE — 3008F PR BODY MASS INDEX (BMI) DOCUMENTED: ICD-10-PCS | Mod: CPTII,S$GLB,, | Performed by: INTERNAL MEDICINE

## 2020-05-14 PROCEDURE — 99999 PR PBB SHADOW E&M-EST. PATIENT-LVL IV: CPT | Mod: PBBFAC,,, | Performed by: INTERNAL MEDICINE

## 2020-05-14 PROCEDURE — 99213 OFFICE O/P EST LOW 20 MIN: CPT | Mod: S$GLB,,, | Performed by: INTERNAL MEDICINE

## 2020-05-14 PROCEDURE — 36415 COLL VENOUS BLD VENIPUNCTURE: CPT | Mod: PO

## 2020-05-14 PROCEDURE — 99999 PR PBB SHADOW E&M-EST. PATIENT-LVL IV: ICD-10-PCS | Mod: PBBFAC,,, | Performed by: INTERNAL MEDICINE
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
## 2020-08-31 DIAGNOSIS — Z78.0 MENOPAUSE: ICD-10-CM

## 2020-08-31 NOTE — TELEPHONE ENCOUNTER
----- Message from Geri Blancas sent at 8/31/2020 11:19 AM CDT -----  Type:  Needs Medical Advice    Who Called: JOVON       Would the patient rather a call back or a response via [facility]? CALL BACK     Best Call Back Number:[phone]    Additional Information: JOVON WOULD LIKE TO KNOW IF [facility] CAN REFILL THIS MEDICATION estradiol (ESTRACE) 1 MG tablet. THE PROVIDER THAT WAS FILLING THE MEDICATION HAS RETIRED

## 2020-09-01 RX ORDER — ESTRADIOL 1 MG/1
1 TABLET ORAL DAILY
Qty: 90 TABLET | Refills: 4 | Status: SHIPPED | OUTPATIENT
Start: 2020-09-01 | End: 2020-09-04 | Stop reason: SDUPTHER

## 2020-09-04 DIAGNOSIS — Z78.0 MENOPAUSE: ICD-10-CM

## 2020-09-04 RX ORDER — ESTRADIOL 1 MG/1
1 TABLET ORAL DAILY
Qty: 90 TABLET | Refills: 4 | Status: SHIPPED | OUTPATIENT
Start: 2020-09-04 | End: 2021-11-15

## 2020-10-05 ENCOUNTER — PATIENT MESSAGE (OUTPATIENT)
Dept: ADMINISTRATIVE | Facility: HOSPITAL | Age: 55
End: 2020-10-05

## 2020-11-17 ENCOUNTER — PATIENT MESSAGE (OUTPATIENT)
Dept: PSYCHIATRY | Facility: CLINIC | Age: 55
End: 2020-11-17

## 2020-11-18 ENCOUNTER — OFFICE VISIT (OUTPATIENT)
Dept: PSYCHIATRY | Facility: CLINIC | Age: 55
End: 2020-11-18
Payer: COMMERCIAL

## 2020-11-18 DIAGNOSIS — F41.0 PANIC ATTACKS: ICD-10-CM

## 2020-11-18 DIAGNOSIS — F33.2 SEVERE EPISODE OF RECURRENT MAJOR DEPRESSIVE DISORDER, WITHOUT PSYCHOTIC FEATURES: Primary | ICD-10-CM

## 2020-11-18 PROCEDURE — 99214 OFFICE O/P EST MOD 30 MIN: CPT | Mod: 95,,, | Performed by: NURSE PRACTITIONER

## 2020-11-18 PROCEDURE — 99214 PR OFFICE/OUTPT VISIT, EST, LEVL IV, 30-39 MIN: ICD-10-PCS | Mod: 95,,, | Performed by: NURSE PRACTITIONER

## 2020-11-18 RX ORDER — GABAPENTIN 300 MG/1
CAPSULE ORAL
Qty: 360 CAPSULE | Refills: 1 | Status: SHIPPED | OUTPATIENT
Start: 2020-11-18 | End: 2021-02-08

## 2020-11-18 RX ORDER — DULOXETIN HYDROCHLORIDE 60 MG/1
60 CAPSULE, DELAYED RELEASE ORAL DAILY
Qty: 90 CAPSULE | Refills: 2 | Status: SHIPPED | OUTPATIENT
Start: 2020-11-18 | End: 2021-02-17

## 2020-11-18 RX ORDER — TRAZODONE HYDROCHLORIDE 100 MG/1
100 TABLET ORAL NIGHTLY PRN
Qty: 90 TABLET | Refills: 1 | Status: SHIPPED | OUTPATIENT
Start: 2020-11-18 | End: 2022-01-12

## 2020-11-18 NOTE — PROGRESS NOTES
Outpatient Psychiatry Follow-Up Visit (MD/NP)    2020    The patient location is: [place]  The chief complaint leading to consultation is: depression, anxiety, ETOH abuse in remission    Visit type: audiovisual    Face to Face time with patient: 29 minutes  30 minutes of total time spent on the encounter, which includes face to face time and non-face to face time preparing to see the patient (eg, review of tests), Obtaining and/or reviewing separately obtained history, Documenting clinical information in the electronic or other health record, Independently interpreting results (not separately reported) and communicating results to the patient/family/caregiver, or Care coordination (not separately reported).     Each patient to whom he or she provides medical services by telemedicine is:  (1) informed of the relationship between the physician and patient and the respective role of any other health care provider with respect to management of the patient; and (2) notified that he or she may decline to receive medical services by telemedicine and may withdraw from such care at any time.    Clinical Status of Patient:  Outpatient (Ambulatory)    Chief Complaint:  Soledad Onofre is a 55 y.o. female who presents today for follow-up of depression and anxiety via video visit.  Met with patient.      Interval History and Content of Current Session:  Interim Events/Subjective Report/Content of Current Session:   Soledad Onofre checked in on time for her appointment. She reports is doing good, just got back from [facility]. Since last visit-  for a friend. Now sitting with her MIL every Friday. Trying to see her mother more who lives in [facility]. Daughter moved back home about 1.5 months ago which is nice to have her here. Since being back form vacation she is on a diet, trying to start bicycling and being more active. Recently found out she has a herniated disc - very limited on house work,  "gardening. Is considering getting an epidural and possibly surgery in the future. Report overall "life in general is pretty good."  Denies SI/HI/AVH. No objective s/sx of psychosis or rodo. Patient verbalized motivation for compliance with medications and all other elements of treatment plan.       Review of Systems   · PSYCHIATRIC: Pertinant items are noted in the narrative.  · CONSTITUTIONAL: No weight gain or loss.   · NEUROLOGIC: No weakness, sensory changes, seizures, confusion, memory loss, tremor or other abnormal movements.  · RESPIRATORY: No shortness of breath.  · CARDIOVASCULAR: No tachycardia or chest pain.  · GASTROINTESTINAL: No nausea, vomiting, pain, constipation or diarrhea.    Past Medical, Family and Social History: The patient's past medical, family and social history have been reviewed and updated as appropriate within the electronic medical record - see encounter notes.    Compliance: yes    Side effects: see above    Risk Parameters:  Patient reports no suicidal ideation  Patient reports no homicidal ideation  Patient reports no self-injurious behavior  Patient reports no violent behavior    Exam (detailed: at least 9 elements; comprehensive: all 15 elements)   Constitutional      General:  unremarkable, age appropriate       Psychiatric  Speech:  no latency; no press   Mood & Affect:  steady  congruent and appropriate   Thought Process:  normal and logical   Associations:  intact   Thought Content:  normal, no suicidality, no homicidality, delusions, or paranoia   Insight:  intact   Judgement: behavior is adequate to circumstances   Orientation:  grossly intact   Memory: intact for content of interview   Language: grossly intact   Attention Span & Concentration:  able to focus   Fund of Knowledge:  intact and appropriate to age and level of education     Assessment and Diagnosis   Status/Progress: Based on the examination today, the patient's problem(s) is/are improved.  New problems have " "not been presented today.   Co-morbidities, Diagnostic uncertainty and Lack of compliance are not complicating management of the primary condition.  There are no active rule-out diagnoses for this patient at this time.     General Impression: Soledad Onofre is a 53 y.o. female who presents today for follow-up of depression and anxiety. Since last visit she is "doing fantastic. Its been great." " Its a lot of pills but it works so that's the main thing." Hardly any anxiety every. Increase energy. Increase appetite. Feeling of guilt have improved greatly. Has dry mouth and using trazodone more but attributes it to increase in stress recently.Presents 5/1/20 - many new situational stressors and challenges. Presents 11/18/20- doing well      ICD-10-CM ICD-9-CM   1. Severe episode of recurrent major depressive disorder, without psychotic features  F33.2 296.33   2. Panic attacks  F41.0 300.01       Intervention/Counseling/Treatment Plan   · Medication Management: Continue current medications  ? Trazodone 100 mg by mouth nightly as needed  ?  Cymbalta 60 mg daily  ? Neurontin 300 mg BID and 600 mg at night  ? The risks and benefits of medication were discussed with the patient.  · AA/NA/CA/ACOA/Abstinence  · The treatment plan and follow up plan were reviewed with the patient.  Discussed diagnosis, risks and benefits of proposed treatment above vs alternative treatments vs no treatment, and potential side effects of these treatments. The patient expresses understanding of the above and displays the capacity to agree with this treatment given said understanding. Patient also agrees that, currently, the benefits outweigh the risks and would like to pursue treatment at this time.  Discussed inherent unpredictability of medications in each individual.   Encouraged Patient to keep future appointments.   Take medications as prescribed and abstain from substance abuse.   In the event of an emergency patient was advised to go " to the emergency room      Return to Clinic: 3 months or sooner    Meera Diaz DNP-BC PMHNP  [facility]

## 2021-01-04 ENCOUNTER — OFFICE VISIT (OUTPATIENT)
Dept: URGENT CARE | Facility: CLINIC | Age: 56
End: 2021-01-04
Payer: COMMERCIAL

## 2021-01-04 VITALS
DIASTOLIC BLOOD PRESSURE: 70 MMHG | HEIGHT: 64 IN | BODY MASS INDEX: 25.95 KG/M2 | RESPIRATION RATE: 12 BRPM | HEART RATE: 85 BPM | WEIGHT: 152 LBS | SYSTOLIC BLOOD PRESSURE: 112 MMHG | OXYGEN SATURATION: 100 % | TEMPERATURE: 98 F

## 2021-01-04 DIAGNOSIS — Z20.822 CLOSE EXPOSURE TO COVID-19 VIRUS: Primary | ICD-10-CM

## 2021-01-04 LAB
CTP QC/QA: YES
SARS-COV-2 RDRP RESP QL NAA+PROBE: NEGATIVE

## 2021-01-04 PROCEDURE — 99214 OFFICE O/P EST MOD 30 MIN: CPT | Mod: S$GLB,,, | Performed by: NURSE PRACTITIONER

## 2021-01-04 PROCEDURE — 3008F PR BODY MASS INDEX (BMI) DOCUMENTED: ICD-10-PCS | Mod: CPTII,S$GLB,, | Performed by: NURSE PRACTITIONER

## 2021-01-04 PROCEDURE — U0002 COVID-19 LAB TEST NON-CDC: HCPCS | Mod: QW,S$GLB,, | Performed by: NURSE PRACTITIONER

## 2021-01-04 PROCEDURE — 3008F BODY MASS INDEX DOCD: CPT | Mod: CPTII,S$GLB,, | Performed by: NURSE PRACTITIONER

## 2021-01-04 PROCEDURE — 99214 PR OFFICE/OUTPT VISIT, EST, LEVL IV, 30-39 MIN: ICD-10-PCS | Mod: S$GLB,,, | Performed by: NURSE PRACTITIONER

## 2021-01-04 PROCEDURE — U0002: ICD-10-PCS | Mod: QW,S$GLB,, | Performed by: NURSE PRACTITIONER

## 2021-01-22 ENCOUNTER — CLINICAL SUPPORT (OUTPATIENT)
Dept: URGENT CARE | Facility: CLINIC | Age: 56
End: 2021-01-22
Payer: COMMERCIAL

## 2021-01-22 VITALS — OXYGEN SATURATION: 97 % | TEMPERATURE: 99 F | HEART RATE: 97 BPM

## 2021-01-22 DIAGNOSIS — Z11.59 ENCOUNTER FOR SCREENING FOR OTHER VIRAL DISEASES: Primary | ICD-10-CM

## 2021-01-22 LAB
CTP QC/QA: YES
SARS-COV-2 RDRP RESP QL NAA+PROBE: NEGATIVE

## 2021-01-22 PROCEDURE — U0002 COVID-19 LAB TEST NON-CDC: HCPCS | Mod: QW,S$GLB,, | Performed by: PHYSICIAN ASSISTANT

## 2021-01-22 PROCEDURE — U0002: ICD-10-PCS | Mod: QW,S$GLB,, | Performed by: PHYSICIAN ASSISTANT

## 2021-01-28 DIAGNOSIS — Z12.31 OTHER SCREENING MAMMOGRAM: ICD-10-CM

## 2021-03-23 ENCOUNTER — PATIENT MESSAGE (OUTPATIENT)
Dept: PSYCHIATRY | Facility: CLINIC | Age: 56
End: 2021-03-23

## 2021-03-30 ENCOUNTER — PATIENT OUTREACH (OUTPATIENT)
Dept: ADMINISTRATIVE | Facility: OTHER | Age: 56
End: 2021-03-30

## 2021-04-05 ENCOUNTER — PATIENT MESSAGE (OUTPATIENT)
Dept: ADMINISTRATIVE | Facility: HOSPITAL | Age: 56
End: 2021-04-05

## 2021-05-13 ENCOUNTER — PATIENT OUTREACH (OUTPATIENT)
Dept: ADMINISTRATIVE | Facility: HOSPITAL | Age: 56
End: 2021-05-13

## 2021-05-14 ENCOUNTER — PATIENT OUTREACH (OUTPATIENT)
Dept: ADMINISTRATIVE | Facility: HOSPITAL | Age: 56
End: 2021-05-14

## 2021-07-06 ENCOUNTER — PATIENT MESSAGE (OUTPATIENT)
Dept: ADMINISTRATIVE | Facility: HOSPITAL | Age: 56
End: 2021-07-06

## 2021-07-18 ENCOUNTER — PATIENT MESSAGE (OUTPATIENT)
Dept: ADMINISTRATIVE | Facility: HOSPITAL | Age: 56
End: 2021-07-18

## 2021-08-16 ENCOUNTER — PATIENT MESSAGE (OUTPATIENT)
Dept: ADMINISTRATIVE | Facility: HOSPITAL | Age: 56
End: 2021-08-16

## 2021-08-18 ENCOUNTER — PATIENT MESSAGE (OUTPATIENT)
Dept: ADMINISTRATIVE | Facility: HOSPITAL | Age: 56
End: 2021-08-18

## 2021-08-23 ENCOUNTER — TELEPHONE (OUTPATIENT)
Dept: INTERNAL MEDICINE | Facility: CLINIC | Age: 56
End: 2021-08-23

## 2021-08-23 ENCOUNTER — OFFICE VISIT (OUTPATIENT)
Dept: INTERNAL MEDICINE | Facility: CLINIC | Age: 56
End: 2021-08-23
Payer: COMMERCIAL

## 2021-08-23 DIAGNOSIS — R19.7 DIARRHEA, UNSPECIFIED TYPE: Primary | ICD-10-CM

## 2021-08-23 PROCEDURE — 99214 PR OFFICE/OUTPT VISIT, EST, LEVL IV, 30-39 MIN: ICD-10-PCS | Mod: 95,,, | Performed by: STUDENT IN AN ORGANIZED HEALTH CARE EDUCATION/TRAINING PROGRAM

## 2021-08-23 PROCEDURE — 99214 OFFICE O/P EST MOD 30 MIN: CPT | Mod: 95,,, | Performed by: STUDENT IN AN ORGANIZED HEALTH CARE EDUCATION/TRAINING PROGRAM

## 2021-08-27 ENCOUNTER — LAB VISIT (OUTPATIENT)
Dept: LAB | Facility: HOSPITAL | Age: 56
End: 2021-08-27
Attending: STUDENT IN AN ORGANIZED HEALTH CARE EDUCATION/TRAINING PROGRAM
Payer: COMMERCIAL

## 2021-08-27 DIAGNOSIS — R19.7 DIARRHEA, UNSPECIFIED TYPE: ICD-10-CM

## 2021-08-27 PROCEDURE — 87177 OVA AND PARASITES SMEARS: CPT | Performed by: STUDENT IN AN ORGANIZED HEALTH CARE EDUCATION/TRAINING PROGRAM

## 2021-08-27 PROCEDURE — 87045 FECES CULTURE AEROBIC BACT: CPT | Performed by: STUDENT IN AN ORGANIZED HEALTH CARE EDUCATION/TRAINING PROGRAM

## 2021-08-27 PROCEDURE — 87427 SHIGA-LIKE TOXIN AG IA: CPT | Mod: 59 | Performed by: STUDENT IN AN ORGANIZED HEALTH CARE EDUCATION/TRAINING PROGRAM

## 2021-08-27 PROCEDURE — 87046 STOOL CULTR AEROBIC BACT EA: CPT | Performed by: STUDENT IN AN ORGANIZED HEALTH CARE EDUCATION/TRAINING PROGRAM

## 2021-08-30 ENCOUNTER — PATIENT MESSAGE (OUTPATIENT)
Dept: INTERNAL MEDICINE | Facility: CLINIC | Age: 56
End: 2021-08-30

## 2021-08-30 LAB
E COLI SXT1 STL QL IA: NEGATIVE
E COLI SXT2 STL QL IA: NEGATIVE

## 2021-08-31 ENCOUNTER — PATIENT MESSAGE (OUTPATIENT)
Dept: INTERNAL MEDICINE | Facility: CLINIC | Age: 56
End: 2021-08-31

## 2021-08-31 LAB — BACTERIA STL CULT: NORMAL

## 2021-09-04 LAB — O+P STL MICRO: NORMAL

## 2021-09-29 ENCOUNTER — PATIENT MESSAGE (OUTPATIENT)
Dept: INTERNAL MEDICINE | Facility: CLINIC | Age: 56
End: 2021-09-29

## 2021-09-29 ENCOUNTER — TELEPHONE (OUTPATIENT)
Dept: INTERNAL MEDICINE | Facility: CLINIC | Age: 56
End: 2021-09-29

## 2021-09-29 DIAGNOSIS — Z12.11 COLON CANCER SCREENING: Primary | ICD-10-CM

## 2021-10-04 ENCOUNTER — TELEPHONE (OUTPATIENT)
Dept: INTERNAL MEDICINE | Facility: CLINIC | Age: 56
End: 2021-10-04

## 2021-10-04 ENCOUNTER — PATIENT MESSAGE (OUTPATIENT)
Dept: ADMINISTRATIVE | Facility: HOSPITAL | Age: 56
End: 2021-10-04

## 2021-10-04 ENCOUNTER — TELEPHONE (OUTPATIENT)
Dept: INTERNAL MEDICINE | Facility: CLINIC | Age: 56
End: 2021-10-04
Payer: COMMERCIAL

## 2021-10-04 DIAGNOSIS — Z00.00 ROUTINE MEDICAL EXAM: Primary | ICD-10-CM

## 2021-10-05 ENCOUNTER — LAB VISIT (OUTPATIENT)
Dept: LAB | Facility: HOSPITAL | Age: 56
End: 2021-10-05
Attending: INTERNAL MEDICINE
Payer: COMMERCIAL

## 2021-10-05 DIAGNOSIS — Z00.00 ROUTINE MEDICAL EXAM: ICD-10-CM

## 2021-10-05 LAB
ALBUMIN SERPL BCP-MCNC: 3.7 G/DL (ref 3.5–5.2)
ALP SERPL-CCNC: 61 U/L (ref 55–135)
ALT SERPL W/O P-5'-P-CCNC: 16 U/L (ref 10–44)
ANION GAP SERPL CALC-SCNC: 10 MMOL/L (ref 8–16)
AST SERPL-CCNC: 13 U/L (ref 10–40)
BASOPHILS # BLD AUTO: 0.05 K/UL (ref 0–0.2)
BASOPHILS NFR BLD: 0.9 % (ref 0–1.9)
BILIRUB SERPL-MCNC: 0.7 MG/DL (ref 0.1–1)
BUN SERPL-MCNC: 15 MG/DL (ref 6–20)
CALCIUM SERPL-MCNC: 9.1 MG/DL (ref 8.7–10.5)
CHLORIDE SERPL-SCNC: 105 MMOL/L (ref 95–110)
CHOLEST SERPL-MCNC: 195 MG/DL (ref 120–199)
CHOLEST/HDLC SERPL: 3.3 {RATIO} (ref 2–5)
CO2 SERPL-SCNC: 21 MMOL/L (ref 23–29)
CREAT SERPL-MCNC: 0.9 MG/DL (ref 0.5–1.4)
DIFFERENTIAL METHOD: ABNORMAL
EOSINOPHIL # BLD AUTO: 0.2 K/UL (ref 0–0.5)
EOSINOPHIL NFR BLD: 4.3 % (ref 0–8)
ERYTHROCYTE [DISTWIDTH] IN BLOOD BY AUTOMATED COUNT: 14.1 % (ref 11.5–14.5)
EST. GFR  (AFRICAN AMERICAN): >60 ML/MIN/1.73 M^2
EST. GFR  (NON AFRICAN AMERICAN): >60 ML/MIN/1.73 M^2
ESTIMATED AVG GLUCOSE: 100 MG/DL (ref 68–131)
GLUCOSE SERPL-MCNC: 80 MG/DL (ref 70–110)
HBA1C MFR BLD: 5.1 % (ref 4–5.6)
HCT VFR BLD AUTO: 40.3 % (ref 37–48.5)
HDLC SERPL-MCNC: 60 MG/DL (ref 40–75)
HDLC SERPL: 30.8 % (ref 20–50)
HGB BLD-MCNC: 13 G/DL (ref 12–16)
IMM GRANULOCYTES # BLD AUTO: 0.01 K/UL (ref 0–0.04)
IMM GRANULOCYTES NFR BLD AUTO: 0.2 % (ref 0–0.5)
LDLC SERPL CALC-MCNC: 114.6 MG/DL (ref 63–159)
LYMPHOCYTES # BLD AUTO: 1.8 K/UL (ref 1–4.8)
LYMPHOCYTES NFR BLD: 31.6 % (ref 18–48)
MCH RBC QN AUTO: 31.3 PG (ref 27–31)
MCHC RBC AUTO-ENTMCNC: 32.3 G/DL (ref 32–36)
MCV RBC AUTO: 97 FL (ref 82–98)
MONOCYTES # BLD AUTO: 0.4 K/UL (ref 0.3–1)
MONOCYTES NFR BLD: 7.5 % (ref 4–15)
NEUTROPHILS # BLD AUTO: 3.1 K/UL (ref 1.8–7.7)
NEUTROPHILS NFR BLD: 55.5 % (ref 38–73)
NONHDLC SERPL-MCNC: 135 MG/DL
NRBC BLD-RTO: 0 /100 WBC
PLATELET # BLD AUTO: 258 K/UL (ref 150–450)
PMV BLD AUTO: 10.6 FL (ref 9.2–12.9)
POTASSIUM SERPL-SCNC: 4.1 MMOL/L (ref 3.5–5.1)
PROT SERPL-MCNC: 6.4 G/DL (ref 6–8.4)
RBC # BLD AUTO: 4.15 M/UL (ref 4–5.4)
SODIUM SERPL-SCNC: 136 MMOL/L (ref 136–145)
T4 FREE SERPL-MCNC: 0.81 NG/DL (ref 0.71–1.51)
TRIGL SERPL-MCNC: 102 MG/DL (ref 30–150)
TSH SERPL DL<=0.005 MIU/L-ACNC: 0.39 UIU/ML (ref 0.4–4)
WBC # BLD AUTO: 5.63 K/UL (ref 3.9–12.7)

## 2021-10-05 PROCEDURE — 36415 COLL VENOUS BLD VENIPUNCTURE: CPT | Mod: PO | Performed by: INTERNAL MEDICINE

## 2021-10-05 PROCEDURE — 83036 HEMOGLOBIN GLYCOSYLATED A1C: CPT | Performed by: INTERNAL MEDICINE

## 2021-10-05 PROCEDURE — 85025 COMPLETE CBC W/AUTO DIFF WBC: CPT | Performed by: INTERNAL MEDICINE

## 2021-10-05 PROCEDURE — 84443 ASSAY THYROID STIM HORMONE: CPT | Performed by: INTERNAL MEDICINE

## 2021-10-05 PROCEDURE — 80061 LIPID PANEL: CPT | Performed by: INTERNAL MEDICINE

## 2021-10-05 PROCEDURE — 80053 COMPREHEN METABOLIC PANEL: CPT | Performed by: INTERNAL MEDICINE

## 2021-10-05 PROCEDURE — 84439 ASSAY OF FREE THYROXINE: CPT | Performed by: INTERNAL MEDICINE

## 2021-10-11 ENCOUNTER — HOSPITAL ENCOUNTER (OUTPATIENT)
Dept: RADIOLOGY | Facility: HOSPITAL | Age: 56
Discharge: HOME | End: 2021-10-11
Attending: INTERNAL MEDICINE
Payer: COMMERCIAL

## 2021-10-11 DIAGNOSIS — Z12.31 OTHER SCREENING MAMMOGRAM: ICD-10-CM

## 2021-10-11 PROCEDURE — 77063 BREAST TOMOSYNTHESIS BI: CPT | Mod: 26,,, | Performed by: RADIOLOGY

## 2021-10-11 PROCEDURE — 77067 SCR MAMMO BI INCL CAD: CPT | Mod: TC

## 2021-10-11 PROCEDURE — 77067 MAMMO DIGITAL SCREENING BILAT WITH TOMO: ICD-10-PCS | Mod: 26,,, | Performed by: RADIOLOGY

## 2021-10-11 PROCEDURE — 77063 MAMMO DIGITAL SCREENING BILAT WITH TOMO: ICD-10-PCS | Mod: 26,,, | Performed by: RADIOLOGY

## 2021-10-11 PROCEDURE — 77067 SCR MAMMO BI INCL CAD: CPT | Mod: 26,,, | Performed by: RADIOLOGY

## 2021-10-20 RX ORDER — ACETAMINOPHEN AND CODEINE PHOSPHATE 120; 12 MG/5ML; MG/5ML
SOLUTION ORAL
Qty: 90 TABLET | Refills: 3 | Status: SHIPPED | OUTPATIENT
Start: 2021-10-20 | End: 2022-08-24 | Stop reason: SDUPTHER

## 2022-01-11 LAB — NONINV COLON CA DNA+OCC BLD SCRN STL QL: NEGATIVE

## 2022-01-12 ENCOUNTER — OFFICE VISIT (OUTPATIENT)
Dept: INTERNAL MEDICINE | Facility: CLINIC | Age: 57
End: 2022-01-12
Payer: COMMERCIAL

## 2022-01-12 VITALS
HEIGHT: 65 IN | BODY MASS INDEX: 26 KG/M2 | TEMPERATURE: 98 F | RESPIRATION RATE: 18 BRPM | WEIGHT: 156.06 LBS | SYSTOLIC BLOOD PRESSURE: 114 MMHG | OXYGEN SATURATION: 99 % | DIASTOLIC BLOOD PRESSURE: 72 MMHG | HEART RATE: 88 BPM

## 2022-01-12 DIAGNOSIS — Z00.00 ROUTINE MEDICAL EXAM: Primary | ICD-10-CM

## 2022-01-12 PROCEDURE — 1160F RVW MEDS BY RX/DR IN RCRD: CPT | Mod: CPTII,S$GLB,, | Performed by: INTERNAL MEDICINE

## 2022-01-12 PROCEDURE — 3008F BODY MASS INDEX DOCD: CPT | Mod: CPTII,S$GLB,, | Performed by: INTERNAL MEDICINE

## 2022-01-12 PROCEDURE — 99999 PR PBB SHADOW E&M-EST. PATIENT-LVL V: CPT | Mod: PBBFAC,,, | Performed by: INTERNAL MEDICINE

## 2022-01-12 PROCEDURE — 99396 PR PREVENTIVE VISIT,EST,40-64: ICD-10-PCS | Mod: S$GLB,,, | Performed by: INTERNAL MEDICINE

## 2022-01-12 PROCEDURE — 99999 PR PBB SHADOW E&M-EST. PATIENT-LVL V: ICD-10-PCS | Mod: PBBFAC,,, | Performed by: INTERNAL MEDICINE

## 2022-01-12 PROCEDURE — 1160F PR REVIEW ALL MEDS BY PRESCRIBER/CLIN PHARMACIST DOCUMENTED: ICD-10-PCS | Mod: CPTII,S$GLB,, | Performed by: INTERNAL MEDICINE

## 2022-01-12 PROCEDURE — 99396 PREV VISIT EST AGE 40-64: CPT | Mod: S$GLB,,, | Performed by: INTERNAL MEDICINE

## 2022-01-12 PROCEDURE — 3078F PR MOST RECENT DIASTOLIC BLOOD PRESSURE < 80 MM HG: ICD-10-PCS | Mod: CPTII,S$GLB,, | Performed by: INTERNAL MEDICINE

## 2022-01-12 PROCEDURE — 3008F PR BODY MASS INDEX (BMI) DOCUMENTED: ICD-10-PCS | Mod: CPTII,S$GLB,, | Performed by: INTERNAL MEDICINE

## 2022-01-12 PROCEDURE — 1159F MED LIST DOCD IN RCRD: CPT | Mod: CPTII,S$GLB,, | Performed by: INTERNAL MEDICINE

## 2022-01-12 PROCEDURE — 3078F DIAST BP <80 MM HG: CPT | Mod: CPTII,S$GLB,, | Performed by: INTERNAL MEDICINE

## 2022-01-12 PROCEDURE — 3074F SYST BP LT 130 MM HG: CPT | Mod: CPTII,S$GLB,, | Performed by: INTERNAL MEDICINE

## 2022-01-12 PROCEDURE — 1159F PR MEDICATION LIST DOCUMENTED IN MEDICAL RECORD: ICD-10-PCS | Mod: CPTII,S$GLB,, | Performed by: INTERNAL MEDICINE

## 2022-01-12 PROCEDURE — 3074F PR MOST RECENT SYSTOLIC BLOOD PRESSURE < 130 MM HG: ICD-10-PCS | Mod: CPTII,S$GLB,, | Performed by: INTERNAL MEDICINE

## 2022-01-12 RX ORDER — RISPERIDONE 1 MG/1
1 TABLET ORAL NIGHTLY
COMMUNITY
Start: 2021-11-28 | End: 2022-01-12 | Stop reason: SDUPTHER

## 2022-01-12 RX ORDER — RISPERIDONE 1 MG/1
1 TABLET ORAL NIGHTLY
Qty: 30 TABLET | Refills: 2 | Status: SHIPPED | OUTPATIENT
Start: 2022-01-12 | End: 2022-02-24 | Stop reason: SDUPTHER

## 2022-01-12 NOTE — PROGRESS NOTES
The patient is a 56 y.o. old female who presents to the office for a physical.  Review of labs reveals mildly depressed TSH.    PAST MEDICAL HISTORY  Past Medical History:   Diagnosis Date    Anxiety     Substance abuse     ETOH, Cocaine, Methamphetamines (Ecstasy, Crystal meth)       SURGICAL HISTORY:  Past Surgical History:   Procedure Laterality Date     SECTION           MEDS:  Medcard reviewed and updated    ALLERGIES: Allergy Card reviewed and updated    SOCIAL HISTORY:   The patient is a nonsmoker, denies alcohol or illicit drug use.    ROS:  GENERAL: No fever, chills, fatigability or weight loss.  SKIN: No rashes.  HEAD: No headaches or recent head trauma.  EYES: No photophobia, ocular pain or diplopia.  EARS: Denies ear pain, discharge or vertigo.  NOSE: No epistaxis.  Positive postnasal drip.  MOUTH & THROAT: No hoarseness or change in voice.   NODES: Denies swollen glands.  CHEST: Denies shortness of breath, wheezing, cough and sputum production.  CARDIOVASCULAR: Denies chest pain or palpitations.  ABDOMEN: Appetite fine. Denies diarrhea, abdominal pain, constipation or blood in stool.  URINARY: No dysuria or hematuria.  MUSCULOSKELETAL: No joint stiffness or swelling. Denies back pain.  NEUROLOGIC: No history of seizures.  ENDOCRINE: Denies polyuria or polydipsia.  PSYCHIATRIC: Denies mood swings, depression, anxiety, homicidal or suicidal thoughts.    SCREENINGS:  Last cholesterol:   Last colonoscopy/ cologuard:   Last mammogram:   Last Pap smear: 2019  Last tetanus: 2016  Last Pneumovax: none  Last eye exam:   Last bone density: non  Last menstrual period: postmenopausal    PE:   Vitals:  Vitals:    22 1504   BP: 114/72   Pulse: 88   Resp: 18   Temp: 97.5 °F (36.4 °C)       APPEARANCE: Well nourished, well developed, in no acute distress.    EYES: Sclerae anicteric. PERRL. EOMI.      EARS: TM's intact. No retraction or perforation.    NOSE: Mucosa pink. Airway  clear.  MOUTH & THROAT: No tonsillar enlargement. No pharyngeal erythema or exudate. No stridor.  NECK: Supple, no thyromegaly.  CHEST: Lungs clear to auscultation with unlabored respirations.  CARDIOVASCULAR: Normal S1, S2. No murmurs. No carotid bruits. No pedal edema.  ABDOMEN: Bowel sounds normal. Not distended. Soft. No tenderness or masses.   MUSCULOSKELETAL:  Normal gait, no cyanosis or clubbing.   SKIN: Normal skin turgor, warm and dry.  NEUROLOGIC:  Cranial Nerves: Intact.  PSYCHIATRIC: The patient is oriented to person, place, and time and has a pleasant affect.        ASSESSMENT/PLAN:  Soledad was seen today for annual exam.    Diagnoses and all orders for this visit:    Routine medical exam  -     TSH; Future  -     Ambulatory referral/consult to Obstetrics / Gynecology; Future    Other orders  -     risperiDONE (RISPERDAL) 1 MG tablet; Take 1 tablet (1 mg total) by mouth nightly.

## 2022-01-18 ENCOUNTER — LAB VISIT (OUTPATIENT)
Dept: PRIMARY CARE CLINIC | Facility: CLINIC | Age: 57
End: 2022-01-18
Payer: COMMERCIAL

## 2022-01-18 DIAGNOSIS — Z20.822 CONTACT WITH AND (SUSPECTED) EXPOSURE TO COVID-19: ICD-10-CM

## 2022-01-18 LAB
CTP QC/QA: YES
SARS-COV-2 AG RESP QL IA.RAPID: NEGATIVE

## 2022-01-18 PROCEDURE — 87811 SARS-COV-2 COVID19 W/OPTIC: CPT

## 2022-01-29 ENCOUNTER — PATIENT MESSAGE (OUTPATIENT)
Dept: PSYCHIATRY | Facility: CLINIC | Age: 57
End: 2022-01-29
Payer: COMMERCIAL

## 2022-01-29 ENCOUNTER — PATIENT MESSAGE (OUTPATIENT)
Dept: INTERNAL MEDICINE | Facility: CLINIC | Age: 57
End: 2022-01-29
Payer: COMMERCIAL

## 2022-02-05 ENCOUNTER — PATIENT OUTREACH (OUTPATIENT)
Dept: ADMINISTRATIVE | Facility: OTHER | Age: 57
End: 2022-02-05
Payer: COMMERCIAL

## 2022-02-05 NOTE — PROGRESS NOTES
Health Maintenance Due   Topic Date Due    Hepatitis C Screening  Never done    HIV Screening  Never done    Shingles Vaccine (1 of 2) Never done    Influenza Vaccine (1) 09/01/2021    Colorectal Cancer Screening  01/03/2022     Updates were requested from care everywhere.  Chart was reviewed for overdue Proactive [facility] Encounters (VON) topics (CRS, Breast Cancer Screening, Eye exam)  Health Maintenance has been updated.  LINKS immunization registry triggered.  Immunizations were reconciled.

## 2022-02-07 NOTE — TELEPHONE ENCOUNTER
Pt is overwhelmed taking care of her mother and is requesting a med to relax her. Please advise.  LOV:01/12/2022  RTC:not on file

## 2022-02-07 NOTE — TELEPHONE ENCOUNTER
Returned patient's call.  Patient states her symptoms have improved.  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

## 2022-02-14 ENCOUNTER — PATIENT MESSAGE (OUTPATIENT)
Dept: RESEARCH | Facility: HOSPITAL | Age: 57
End: 2022-02-14
Payer: COMMERCIAL

## 2022-02-24 ENCOUNTER — PATIENT MESSAGE (OUTPATIENT)
Dept: INTERNAL MEDICINE | Facility: CLINIC | Age: 57
End: 2022-02-24
Payer: COMMERCIAL

## 2022-02-24 RX ORDER — RISPERIDONE 1 MG/1
1 TABLET ORAL NIGHTLY
Qty: 90 TABLET | Refills: 2 | Status: SHIPPED | OUTPATIENT
Start: 2022-02-24 | End: 2022-03-22

## 2022-03-11 ENCOUNTER — PATIENT MESSAGE (OUTPATIENT)
Dept: PSYCHIATRY | Facility: CLINIC | Age: 57
End: 2022-03-11
Payer: COMMERCIAL

## 2022-03-22 ENCOUNTER — OFFICE VISIT (OUTPATIENT)
Dept: PSYCHIATRY | Facility: CLINIC | Age: 57
End: 2022-03-22
Payer: COMMERCIAL

## 2022-03-22 DIAGNOSIS — F41.0 PANIC ATTACKS: ICD-10-CM

## 2022-03-22 DIAGNOSIS — F33.2 SEVERE EPISODE OF RECURRENT MAJOR DEPRESSIVE DISORDER, WITHOUT PSYCHOTIC FEATURES: Primary | ICD-10-CM

## 2022-03-22 DIAGNOSIS — F10.11 H/O ETOH ABUSE: ICD-10-CM

## 2022-03-22 PROCEDURE — 1159F PR MEDICATION LIST DOCUMENTED IN MEDICAL RECORD: ICD-10-PCS | Mod: CPTII,95,, | Performed by: NURSE PRACTITIONER

## 2022-03-22 PROCEDURE — 99214 OFFICE O/P EST MOD 30 MIN: CPT | Mod: 95,,, | Performed by: NURSE PRACTITIONER

## 2022-03-22 PROCEDURE — 1160F RVW MEDS BY RX/DR IN RCRD: CPT | Mod: CPTII,95,, | Performed by: NURSE PRACTITIONER

## 2022-03-22 PROCEDURE — 99214 PR OFFICE/OUTPT VISIT, EST, LEVL IV, 30-39 MIN: ICD-10-PCS | Mod: 95,,, | Performed by: NURSE PRACTITIONER

## 2022-03-22 PROCEDURE — 1160F PR REVIEW ALL MEDS BY PRESCRIBER/CLIN PHARMACIST DOCUMENTED: ICD-10-PCS | Mod: CPTII,95,, | Performed by: NURSE PRACTITIONER

## 2022-03-22 PROCEDURE — 1159F MED LIST DOCD IN RCRD: CPT | Mod: CPTII,95,, | Performed by: NURSE PRACTITIONER

## 2022-03-22 RX ORDER — TRAZODONE HYDROCHLORIDE 100 MG/1
100 TABLET ORAL NIGHTLY
Qty: 90 TABLET | Refills: 1 | Status: SHIPPED | OUTPATIENT
Start: 2022-03-22 | End: 2022-10-23 | Stop reason: SDUPTHER

## 2022-03-22 RX ORDER — DULOXETIN HYDROCHLORIDE 60 MG/1
60 CAPSULE, DELAYED RELEASE ORAL DAILY
Qty: 90 CAPSULE | Refills: 1 | Status: SHIPPED | OUTPATIENT
Start: 2022-03-22 | End: 2022-09-01 | Stop reason: SDUPTHER

## 2022-03-22 RX ORDER — GABAPENTIN 300 MG/1
CAPSULE ORAL
Qty: 360 CAPSULE | Refills: 1 | Status: SHIPPED | OUTPATIENT
Start: 2022-03-22 | End: 2022-11-14

## 2022-03-22 NOTE — PROGRESS NOTES
Outpatient Psychiatry Follow-Up Visit (MD/NP)    3/22/2022    The patient location is: [place]  The chief complaint leading to consultation is: depression, anxiety, ETOH abuse in remission    Visit type: audiovisual    Face to Face time with patient: 45 minutes  48 minutes of total time spent on the encounter, which includes face to face time and non-face to face time preparing to see the patient (eg, review of tests), Obtaining and/or reviewing separately obtained history, Documenting clinical information in the electronic or other health record, Independently interpreting results (not separately reported) and communicating results to the patient/family/caregiver, or Care coordination (not separately reported).     Each patient to whom he or she provides medical services by telemedicine is:  (1) informed of the relationship between the physician and patient and the respective role of any other health care provider with respect to management of the patient; and (2) notified that he or she may decline to receive medical services by telemedicine and may withdraw from such care at any time.    Clinical Status of Patient:  Outpatient (Ambulatory)    Chief Complaint:  Soledad Onofre is a 56 y.o. female who presents today for follow-up of depression and anxiety via video visit.  Met with patient.      Interval History and Content of Current Session:  Interim Events/Subjective Report/Content of Current Session:   Soledad Onofre checked in on time for her appointment. 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 In August she was started on Risperidone related to phantom bugs after an episode of bedbugs so stopped gabapentin. She feels she no longer needs Risperdal. She has run out of Cymbalta a few days ago - and has stopped Trazodone. Will restart all these medications as she was doing well on these in the past. Patient agreeable   Denies  SI/HI/AVH. No objective s/sx of psychosis or rodo. Patient verbalized motivation for compliance with medications and all other elements of treatment plan.       Review of Systems   · PSYCHIATRIC: Pertinant items are noted in the narrative.  · CONSTITUTIONAL: No weight gain or loss.   · MUSCULOSKELETAL: No pain or stiffness of the joints.  · RESPIRATORY: No shortness of breath.  · CARDIOVASCULAR: No tachycardia or chest pain.  · GASTROINTESTINAL: No nausea, vomiting, pain, constipation or diarrhea.    Past Medical, Family and Social History: The patient's past medical, family and social history have been reviewed and updated as appropriate within the electronic medical record - see encounter notes.    Compliance: yes    Side effects: see above    Risk Parameters:  Patient reports no suicidal ideation  Patient reports no homicidal ideation  Patient reports no self-injurious behavior  Patient reports no violent behavior    Exam (detailed: at least 9 elements; comprehensive: all 15 elements)   Constitutional      General:  unremarkable, age appropriate       Psychiatric  Speech:  no latency; no press   Mood & Affect:  depressed, irritable, sad  congruent and appropriate   Thought Process:  normal and logical   Associations:  intact   Thought Content:  normal, no suicidality, no homicidality, delusions, or paranoia   Insight:  intact   Judgement: behavior is adequate to circumstances   Orientation:  grossly intact   Memory: intact for content of interview   Language: grossly intact   Attention Span & Concentration:  able to focus   Fund of Knowledge:  intact and appropriate to age and level of education     Assessment and Diagnosis   Status/Progress: Based on the examination today, the patient's problem(s) is/are inadequately controlled.  New problems have not been presented today.   Co-morbidities, Diagnostic uncertainty and Lack of compliance are not complicating management of the primary condition.  There are no  "active rule-out diagnoses for this patient at this time.     General Impression: Soledad Onofre is a 53 y.o. female who presents today for follow-up of depression and anxiety. Since last visit she is "doing fantastic. Its been great." " Its a lot of pills but it works so that's the main thing." Hardly any anxiety every. Increase energy. Increase appetite. Feeling of guilt have improved greatly. Has dry mouth and using trazodone more but attributes it to increase in stress recently.Presents 5/1/20 - many new situational stressors and challenges. Presents 11/18/20- doing well Presents 3/22/22- significant life stressors- will restart all previous medications      ICD-10-CM ICD-9-CM   1. Severe episode of recurrent major depressive disorder, without psychotic features  F33.2 296.33   2. Panic attacks  F41.0 300.01   3. H/O ETOH abuse  F10.11 305.03       Intervention/Counseling/Treatment Plan   · Medication Management: Continue current medications  ? Restart Trazodone 100 mg by mouth nightly as needed  ? Restart Cymbalta 60 mg daily  ? Restart Gabapentin 300 mg BID and 600 mg nightly  ? Stop Risperidone 1 mg daily by dermatologist  ? The risks and benefits of medication were discussed with the patient.  · AA/NA/CA/ACOA/Abstinence  · The treatment plan and follow up plan were reviewed with the patient.  Discussed diagnosis, risks and benefits of proposed treatment above vs alternative treatments vs no treatment, and potential side effects of these treatments. The patient expresses understanding of the above and displays the capacity to agree with this treatment given said understanding. Patient also agrees that, currently, the benefits outweigh the risks and would like to pursue treatment at this time.  Discussed inherent unpredictability of medications in each individual.   Encouraged Patient to keep future appointments.   Take medications as prescribed and abstain from substance abuse.   In the event of an " emergency patient was advised to go to the emergency room      Return to Clinic: 3 months or sooner    Meera Diaz DNP-BC PMHNP  [facility]

## 2022-08-03 ENCOUNTER — OFFICE VISIT (OUTPATIENT)
Dept: INTERNAL MEDICINE | Facility: CLINIC | Age: 57
End: 2022-08-03
Payer: COMMERCIAL

## 2022-08-03 DIAGNOSIS — B86 SCABIES: Primary | ICD-10-CM

## 2022-08-03 PROCEDURE — 1160F PR REVIEW ALL MEDS BY PRESCRIBER/CLIN PHARMACIST DOCUMENTED: ICD-10-PCS | Mod: CPTII,95,, | Performed by: INTERNAL MEDICINE

## 2022-08-03 PROCEDURE — 99214 PR OFFICE/OUTPT VISIT, EST, LEVL IV, 30-39 MIN: ICD-10-PCS | Mod: 95,,, | Performed by: INTERNAL MEDICINE

## 2022-08-03 PROCEDURE — 1160F RVW MEDS BY RX/DR IN RCRD: CPT | Mod: CPTII,95,, | Performed by: INTERNAL MEDICINE

## 2022-08-03 PROCEDURE — 1159F MED LIST DOCD IN RCRD: CPT | Mod: CPTII,95,, | Performed by: INTERNAL MEDICINE

## 2022-08-03 PROCEDURE — 99214 OFFICE O/P EST MOD 30 MIN: CPT | Mod: 95,,, | Performed by: INTERNAL MEDICINE

## 2022-08-03 PROCEDURE — 1159F PR MEDICATION LIST DOCUMENTED IN MEDICAL RECORD: ICD-10-PCS | Mod: CPTII,95,, | Performed by: INTERNAL MEDICINE

## 2022-08-03 RX ORDER — PERMETHRIN 50 MG/G
CREAM TOPICAL ONCE
Qty: 60 G | Refills: 0 | Status: SHIPPED | OUTPATIENT
Start: 2022-08-03 | End: 2022-08-03

## 2022-08-03 NOTE — PROGRESS NOTES
The patient location is: home   The chief complaint leading to consultation is: rash , pruritis     Visit type: audio visual tele visit     Face to Face time with patient: 15   20 minutes of total time spent on the encounter, which includes face to face time and non-face to face time preparing to see the patient (eg, review of tests), Obtaining and/or reviewing separately obtained history, Documenting clinical information in the electronic or other health record, Independently interpreting results (not separately reported) and communicating results to the patient/family/caregiver, or Care coordination (not separately reported).         Each patient to whom he or she provides medical services by telemedicine is:  (1) informed of the relationship between the physician and patient and the respective role of any other health care provider with respect to management of the patient; and (2) notified that he or she may decline to receive medical services by telemedicine and may withdraw from such care at any time.    Notes:   [facility] Note    Chief Complaint      Chief Complaint   Patient presents with    Rash       History of Present Illness      Soledad Onofre is a 57 y.o. female who presents today for   Chief Complaint   Patient presents with    Rash   .  Patient comes to appointment here for acute visit related to above . She describes around feet , stomach wrist ,toes and fingers . It is extremely pruritic . She is unclear as to wear she has gotten this from .     Problem List Items Addressed This Visit        Derm    Scabies - Primary    Overview     permethrin cream 5* massage in skin from neck to soles of feet leave on for 8 hrs then remove with shower .                    Past Medical History:  Past Medical History:   Diagnosis Date    Anxiety     Substance abuse     ETOH, Cocaine, Methamphetamines (Ecstasy, Crystal meth)       Past Surgical History:  Past Surgical History:    Procedure Laterality Date     SECTION         Family History:  family history includes Alcohol abuse in her father and maternal grandfather; Cancer in her father; Colon cancer in her father; Depression in her mother; Diabetes in her mother; Hyperlipidemia in her mother; Hypertension in her mother.    Social History:  Social History     Socioeconomic History    Marital status:      Spouse name: Josué    Number of children: 2   Occupational History    Occupation: unemployed   Tobacco Use    Smoking status: Never Smoker    Smokeless tobacco: Never Used   Substance and Sexual Activity    Alcohol use: No     Alcohol/week: 0.0 standard drinks     Comment: Currently in recovery.  Prevoius ETOH abuse    Drug use: Yes     Types: MDMA (Ecstacy), Methamphetamines, Cocaine     Comment: currently in recovery, previous abuse    Sexual activity: Yes     Partners: Male     Birth control/protection: Post-menopausal   Social History Narrative    Lives with  and son (20), daughter (19) at [facility]     Social Determinants of Health     Financial Resource Strain: Low Risk     Difficulty of Paying Living Expenses: Not very hard   Food Insecurity: Unknown    Worried About Running Out of Food in the Last Year: Patient refused    Ran Out of Food in the Last Year: Patient refused   Transportation Needs: No Transportation Needs    Lack of Transportation (Medical): No    Lack of Transportation (Non-Medical): No   Physical Activity: Sufficiently Active    Days of Exercise per Week: 3 days    Minutes of Exercise per Session: 60 min   Stress: Stress Concern Present    Feeling of Stress : To some extent   Social Connections: Unknown    Frequency of Communication with Friends and Family: Once a week    Frequency of Social Gatherings with Friends and Family: Never    Active Member of Clubs or Organizations: No    Attends Club or Organization Meetings: Patient refused    Marital Status:    Housing  Stability: Low Risk     Unable to Pay for Housing in the Last Year: No    Number of Places Lived in the Last Year: 1    Unstable Housing in the Last Year: No       Review of Systems:   Review of Systems   HENT: Negative for hearing loss.    Eyes: Negative for discharge.   Respiratory: Negative for wheezing.    Cardiovascular: Negative for chest pain and palpitations.   Gastrointestinal: Negative for blood in stool, constipation, diarrhea and vomiting.   Genitourinary: Negative for dysuria and hematuria.   Musculoskeletal: Negative for neck pain.   Neurological: Negative for weakness and headaches.   Endo/Heme/Allergies: Negative for polydipsia.         Medications:  Outpatient Encounter Medications as of 8/3/2022   Medication Sig Dispense Refill    calcium-vitamin D3 (OS-LAURYN 500 + D3) 500 mg(1,250mg) -200 unit per tablet Take 1 tablet by mouth 2 (two) times daily with meals.      DULoxetine (CYMBALTA) 60 MG capsule Take 1 capsule (60 mg total) by mouth once daily. 90 capsule 1    estradioL (ESTRACE) 1 MG tablet TAKE 1 TABLET(1 MG) BY MOUTH EVERY DAY 90 tablet 4    fluticasone (FLONASE) 50 mcg/actuation nasal spray 1 spray by Each Nare route continuous prn for Rhinitis. 1 Bottle 3    gabapentin (NEURONTIN) 300 MG capsule Take 1 capsule (300 mg total) by mouth 2 (two) times daily AND 2 capsules (600 mg total) every evening. 360 capsule 1    norethindrone (MICRONOR) 0.35 mg tablet TAKE 1 TABLET BY MOUTH EVERY DAY 90 tablet 3    permethrin (ELIMITE) 5 % cream Apply topically once. Apply 30 g (half tube ) from neck to soles of feet . Leave on body for 8-12 hrs . Then shower off completely . for 1 dose 60 g 0    traZODone (DESYREL) 100 MG tablet Take 1 tablet (100 mg total) by mouth every evening. 90 tablet 1    vitamin D (VITAMIN D3) 1000 units Tab Take 1,000 Units by mouth once daily.       No facility-administered encounter medications on file as of 8/3/2022.        Allergies:  Review of patient's allergies  indicates:   Allergen Reactions    Dog dander     Dog hair standardized allergenic extract     Pollen,fermented          Physical Exam       There were no vitals filed for this visit.      Physical Exam  Constitutional:       General: She is not in acute distress.     Appearance: Normal appearance. She is not ill-appearing.   Eyes:      General:         Right eye: No discharge.         Left eye: No discharge.      Extraocular Movements: Extraocular movements intact.      Pupils: Pupils are equal, round, and reactive to light.   Pulmonary:      Effort: Pulmonary effort is normal.   Neurological:      Mental Status: She is alert and oriented to person, place, and time.   Psychiatric:         Mood and Affect: Mood normal.         Behavior: Behavior normal.         Thought Content: Thought content normal.         Judgment: Judgment normal.          Laboratory:  CBC:  No results for input(s): WBC, RBC, HGB, HCT, PLT, MCV, MCH, MCHC in the last 2160 hours.  CMP:  No results for input(s): GLU, CALCIUM, ALBUMIN, PROT, NA, K, CO2, CL, BUN, ALKPHOS, ALT, AST, BILITOT in the last 2160 hours.    Invalid input(s): CREATININ  URINALYSIS:  No results for input(s): COLORU, CLARITYU, SPECGRAV, PHUR, PROTEINUA, GLUCOSEU, BILIRUBINCON, BLOODU, WBCU, RBCU, BACTERIA, MUCUS, NITRITE, LEUKOCYTESUR, UROBILINOGEN, HYALINECASTS in the last 2160 hours.   LIPIDS:  No results for input(s): TSH, HDL, CHOL, TRIG, LDLCALC, CHOLHDL, NONHDLCHOL, TOTALCHOLEST in the last 2160 hours.  TSH:  No results for input(s): TSH in the last 2160 hours.  A1C:  No results for input(s): HGBA1C in the last 2160 hours.    Radiology:        Assessment:     Soledad Onofre is a 57 y.o.female with:    Scabies  -     permethrin (ELIMITE) 5 % cream; Apply topically once. Apply 30 g (half tube ) from neck to soles of feet . Leave on body for 8-12 hrs . Then shower off completely . for 1 dose  Dispense: 60 g; Refill: 0          Plan:     Problem List Items Addressed  This Visit        Derm    Scabies - Primary    Overview     permethrin cream 5* massage in skin from neck to soles of feet leave on for 8 hrs then remove with shower .                  As above, continue current medications and maintain follow up with specialists.  Return to clinic prn.      Frederick W Dantagnan [facility] [place]                  Answers for HPI/ROS submitted by the patient on 8/2/2022  activity change: Yes  unexpected weight change: No  rhinorrhea: No  trouble swallowing: No  visual disturbance: No  chest tightness: No  polyuria: No  difficulty urinating: No  menstrual problem: No  joint swelling: No  arthralgias: No  confusion: No  dysphoric mood: No

## 2022-08-16 ENCOUNTER — TELEPHONE (OUTPATIENT)
Dept: INTERNAL MEDICINE | Facility: CLINIC | Age: 57
End: 2022-08-16
Payer: COMMERCIAL

## 2022-08-16 DIAGNOSIS — R21 RASH: Primary | ICD-10-CM

## 2022-08-16 NOTE — TELEPHONE ENCOUNTER
Pt states she seen Dr Barrios for skin issues which he thought might be Scabies. He prescribed Permethrin. Pt does not feel this treatment worked because she is still itching and now has red blisters that are on her face, mouth, and all over and I am Suffering everyday. Please advise.

## 2022-08-17 NOTE — TELEPHONE ENCOUNTER
Please contact patient to schedule labs.  Also, recommend referral to Dermatology again for further evaluation.

## 2022-08-24 ENCOUNTER — PATIENT MESSAGE (OUTPATIENT)
Dept: ADMINISTRATIVE | Facility: HOSPITAL | Age: 57
End: 2022-08-24
Payer: COMMERCIAL

## 2022-08-24 RX ORDER — ACETAMINOPHEN AND CODEINE PHOSPHATE 120; 12 MG/5ML; MG/5ML
1 SOLUTION ORAL DAILY
Qty: 90 TABLET | Refills: 0 | Status: SHIPPED | OUTPATIENT
Start: 2022-08-24 | End: 2022-09-02

## 2022-08-29 ENCOUNTER — LAB VISIT (OUTPATIENT)
Dept: LAB | Facility: HOSPITAL | Age: 57
End: 2022-08-29
Attending: DERMATOLOGY
Payer: COMMERCIAL

## 2022-08-29 ENCOUNTER — OFFICE VISIT (OUTPATIENT)
Dept: DERMATOLOGY | Facility: CLINIC | Age: 57
End: 2022-08-29
Payer: COMMERCIAL

## 2022-08-29 DIAGNOSIS — R20.2 PARESTHESIA: ICD-10-CM

## 2022-08-29 DIAGNOSIS — T14.8XXA EXCORIATION: Primary | ICD-10-CM

## 2022-08-29 DIAGNOSIS — R21 RASH: ICD-10-CM

## 2022-08-29 LAB
ALBUMIN SERPL BCP-MCNC: 3.9 G/DL (ref 3.5–5.2)
ALP SERPL-CCNC: 84 U/L (ref 55–135)
ALT SERPL W/O P-5'-P-CCNC: 13 U/L (ref 10–44)
ANION GAP SERPL CALC-SCNC: 7 MMOL/L (ref 8–16)
AST SERPL-CCNC: 12 U/L (ref 10–40)
BILIRUB SERPL-MCNC: 0.3 MG/DL (ref 0.1–1)
BUN SERPL-MCNC: 15 MG/DL (ref 6–20)
CALCIUM SERPL-MCNC: 9.4 MG/DL (ref 8.7–10.5)
CHLORIDE SERPL-SCNC: 107 MMOL/L (ref 95–110)
CO2 SERPL-SCNC: 25 MMOL/L (ref 23–29)
CREAT SERPL-MCNC: 0.8 MG/DL (ref 0.5–1.4)
EST. GFR  (NO RACE VARIABLE): >60 ML/MIN/1.73 M^2
GLUCOSE SERPL-MCNC: 96 MG/DL (ref 70–110)
POTASSIUM SERPL-SCNC: 4.4 MMOL/L (ref 3.5–5.1)
PROT SERPL-MCNC: 6.4 G/DL (ref 6–8.4)
SODIUM SERPL-SCNC: 139 MMOL/L (ref 136–145)
VIT B12 SERPL-MCNC: 412 PG/ML (ref 210–950)

## 2022-08-29 PROCEDURE — 84439 ASSAY OF FREE THYROXINE: CPT | Performed by: DERMATOLOGY

## 2022-08-29 PROCEDURE — 99999 PR PBB SHADOW E&M-EST. PATIENT-LVL III: ICD-10-PCS | Mod: PBBFAC,,, | Performed by: DERMATOLOGY

## 2022-08-29 PROCEDURE — 85025 COMPLETE CBC W/AUTO DIFF WBC: CPT | Performed by: DERMATOLOGY

## 2022-08-29 PROCEDURE — 84466 ASSAY OF TRANSFERRIN: CPT | Performed by: DERMATOLOGY

## 2022-08-29 PROCEDURE — 36415 COLL VENOUS BLD VENIPUNCTURE: CPT | Mod: PN | Performed by: DERMATOLOGY

## 2022-08-29 PROCEDURE — 84480 ASSAY TRIIODOTHYRONINE (T3): CPT | Performed by: DERMATOLOGY

## 2022-08-29 PROCEDURE — 1159F PR MEDICATION LIST DOCUMENTED IN MEDICAL RECORD: ICD-10-PCS | Mod: CPTII,S$GLB,, | Performed by: DERMATOLOGY

## 2022-08-29 PROCEDURE — 86038 ANTINUCLEAR ANTIBODIES: CPT | Performed by: DERMATOLOGY

## 2022-08-29 PROCEDURE — 99203 OFFICE O/P NEW LOW 30 MIN: CPT | Mod: S$GLB,,, | Performed by: DERMATOLOGY

## 2022-08-29 PROCEDURE — 99203 PR OFFICE/OUTPT VISIT, NEW, LEVL III, 30-44 MIN: ICD-10-PCS | Mod: S$GLB,,, | Performed by: DERMATOLOGY

## 2022-08-29 PROCEDURE — 99999 PR PBB SHADOW E&M-EST. PATIENT-LVL III: CPT | Mod: PBBFAC,,, | Performed by: DERMATOLOGY

## 2022-08-29 PROCEDURE — 84630 ASSAY OF ZINC: CPT | Performed by: DERMATOLOGY

## 2022-08-29 PROCEDURE — 82728 ASSAY OF FERRITIN: CPT | Performed by: DERMATOLOGY

## 2022-08-29 PROCEDURE — 84443 ASSAY THYROID STIM HORMONE: CPT | Performed by: DERMATOLOGY

## 2022-08-29 PROCEDURE — 1159F MED LIST DOCD IN RCRD: CPT | Mod: CPTII,S$GLB,, | Performed by: DERMATOLOGY

## 2022-08-29 PROCEDURE — 82607 VITAMIN B-12: CPT | Performed by: DERMATOLOGY

## 2022-08-29 PROCEDURE — 80053 COMPREHEN METABOLIC PANEL: CPT | Performed by: DERMATOLOGY

## 2022-08-29 NOTE — PROGRESS NOTES
Subjective:       Patient ID:  Soledad Onofre is a 57 y.o. female who presents for   Chief Complaint   Patient presents with    Rash     Diffuse, 2yrs, red     Rash - Initial    Review of Systems   Constitutional:  Negative for fever.   HENT:  Negative for sore throat.    Respiratory:  Negative for cough.    Skin:  Positive for rash.   Psychiatric/Behavioral:  Positive for high stress.       Objective:    Physical Exam   Constitutional: She appears well-developed and well-nourished.   Eyes: No conjunctival no injection.   Neurological: She is alert and oriented to person, place, and time.   Psychiatric: She has a normal mood and affect.   Skin:   Areas Examined (abnormalities noted in diagram):   Neck Inspection Performed  RUE Inspected  LUE Inspection Performed            Diagram Legend     Erythematous scaling macule/papule c/w actinic keratosis       Vascular papule c/w angioma      Pigmented verrucoid papule/plaque c/w seborrheic keratosis      Yellow umbilicated papule c/w sebaceous hyperplasia      Irregularly shaped tan macule c/w lentigo     1-2 mm smooth white papules consistent with Milia      Movable subcutaneous cyst with punctum c/w epidermal inclusion cyst      Subcutaneous movable cyst c/w pilar cyst      Firm pink to brown papule c/w dermatofibroma      Pedunculated fleshy papule(s) c/w skin tag(s)      Evenly pigmented macule c/w junctional nevus     Mildly variegated pigmented, slightly irregular-bordered macule c/w mildly atypical nevus      Flesh colored to evenly pigmented papule c/w intradermal nevus       Pink pearly papule/plaque c/w basal cell carcinoma      Erythematous hyperkeratotic cursted plaque c/w SCC      Surgical scar with no sign of skin cancer recurrence      Open and closed comedones      Inflammatory papules and pustules      Verrucoid papule consistent consistent with wart     Erythematous eczematous patches and plaques     Dystrophic onycholytic nail with subungual  debris c/w onychomycosis     Umbilicated papule    Erythematous-base heme-crusted tan verrucoid plaque consistent with inflamed seborrheic keratosis     Erythematous Silvery Scaling Plaque c/w Psoriasis     See annotation      Assessment / Plan:        Excoriation  Pt reports having seen 3 outside derms.    Rash  -     Ambulatory referral/consult to Dermatology  Previous [facility] labs and or records and notes reviewed and considered for their impact on our clinical decision making today.    Paresthesia  -     Zinc; Future; Expected date: 08/29/2022  -     Vitamin B12; Future; Expected date: 08/29/2022  -     T4, Free; Future; Expected date: 08/29/2022  -     Ferritin; Future; Expected date: 08/29/2022  -     Iron and TIBC; Future; Expected date: 08/29/2022  -     TSH; Future; Expected date: 08/29/2022  -     AJIT; Future; Expected date: 08/29/2022  -     T3; Future; Expected date: 08/29/2022  -     CBC Auto Differential; Future; Expected date: 08/29/2022  -     Comprehensive Metabolic Panel; Future; Expected date: 08/29/2022  Discussed with patient the need for laboratory work up for further evaluation.  Discussed that such investigation may not be helpful.  Watch for delusions of parasitosis.  Pt believes she has toxins in the skin.  This is suspected.  Warned that stress can worsen things.         Follow up if symptoms worsen or fail to improve.

## 2022-08-30 LAB
ANA SER QL IF: NORMAL
BASOPHILS # BLD AUTO: 0.05 K/UL (ref 0–0.2)
BASOPHILS NFR BLD: 0.8 % (ref 0–1.9)
DIFFERENTIAL METHOD: ABNORMAL
EOSINOPHIL # BLD AUTO: 0.3 K/UL (ref 0–0.5)
EOSINOPHIL NFR BLD: 4.9 % (ref 0–8)
ERYTHROCYTE [DISTWIDTH] IN BLOOD BY AUTOMATED COUNT: 13.5 % (ref 11.5–14.5)
FERRITIN SERPL-MCNC: 80 NG/ML (ref 20–300)
HCT VFR BLD AUTO: 39.5 % (ref 37–48.5)
HGB BLD-MCNC: 13.2 G/DL (ref 12–16)
IMM GRANULOCYTES # BLD AUTO: 0.01 K/UL (ref 0–0.04)
IMM GRANULOCYTES NFR BLD AUTO: 0.2 % (ref 0–0.5)
IRON SERPL-MCNC: 43 UG/DL (ref 30–160)
LYMPHOCYTES # BLD AUTO: 1.8 K/UL (ref 1–4.8)
LYMPHOCYTES NFR BLD: 29 % (ref 18–48)
MCH RBC QN AUTO: 31.2 PG (ref 27–31)
MCHC RBC AUTO-ENTMCNC: 33.4 G/DL (ref 32–36)
MCV RBC AUTO: 93 FL (ref 82–98)
MONOCYTES # BLD AUTO: 0.6 K/UL (ref 0.3–1)
MONOCYTES NFR BLD: 8.8 % (ref 4–15)
NEUTROPHILS # BLD AUTO: 3.5 K/UL (ref 1.8–7.7)
NEUTROPHILS NFR BLD: 56.3 % (ref 38–73)
NRBC BLD-RTO: 0 /100 WBC
PLATELET # BLD AUTO: 222 K/UL (ref 150–450)
PMV BLD AUTO: 10.1 FL (ref 9.2–12.9)
RBC # BLD AUTO: 4.23 M/UL (ref 4–5.4)
SATURATED IRON: 10 % (ref 20–50)
T3 SERPL-MCNC: 96 NG/DL (ref 60–180)
T4 FREE SERPL-MCNC: 0.92 NG/DL (ref 0.71–1.51)
TOTAL IRON BINDING CAPACITY: 437 UG/DL (ref 250–450)
TRANSFERRIN SERPL-MCNC: 295 MG/DL (ref 200–375)
TSH SERPL DL<=0.005 MIU/L-ACNC: 1.53 UIU/ML (ref 0.4–4)
WBC # BLD AUTO: 6.27 K/UL (ref 3.9–12.7)

## 2022-09-01 ENCOUNTER — PATIENT MESSAGE (OUTPATIENT)
Dept: INTERNAL MEDICINE | Facility: CLINIC | Age: 57
End: 2022-09-01
Payer: COMMERCIAL

## 2022-09-01 ENCOUNTER — PATIENT MESSAGE (OUTPATIENT)
Dept: PSYCHIATRY | Facility: CLINIC | Age: 57
End: 2022-09-01
Payer: COMMERCIAL

## 2022-09-01 DIAGNOSIS — F41.0 PANIC ATTACKS: ICD-10-CM

## 2022-09-01 DIAGNOSIS — F33.2 SEVERE EPISODE OF RECURRENT MAJOR DEPRESSIVE DISORDER, WITHOUT PSYCHOTIC FEATURES: ICD-10-CM

## 2022-09-01 LAB — ZINC SERPL-MCNC: 67 UG/DL (ref 60–130)

## 2022-09-01 RX ORDER — DULOXETIN HYDROCHLORIDE 60 MG/1
60 CAPSULE, DELAYED RELEASE ORAL DAILY
Qty: 90 CAPSULE | Refills: 1 | Status: SHIPPED | OUTPATIENT
Start: 2022-09-01 | End: 2023-04-06 | Stop reason: SDUPTHER

## 2022-09-02 ENCOUNTER — TELEPHONE (OUTPATIENT)
Dept: DERMATOLOGY | Facility: CLINIC | Age: 57
End: 2022-09-02
Payer: COMMERCIAL

## 2022-09-02 ENCOUNTER — PATIENT MESSAGE (OUTPATIENT)
Dept: INTERNAL MEDICINE | Facility: CLINIC | Age: 57
End: 2022-09-02
Payer: COMMERCIAL

## 2022-09-02 RX ORDER — NORETHINDRONE 0.35 MG/1
TABLET ORAL
Qty: 84 TABLET | Refills: 1 | Status: SHIPPED | OUTPATIENT
Start: 2022-09-02 | End: 2023-06-05 | Stop reason: SDUPTHER

## 2022-09-02 NOTE — TELEPHONE ENCOUNTER
M for the pt to call back for lab results.    [place]       ----- Message from Darnell Davenport MD sent at 9/1/2022  1:18 PM CDT -----  Needs 30 mg zinc qd and 30-40 mg iron qd.  ----- Message -----  From: [facility] [employer] [facility]  Sent: 8/29/2022  11:56 PM CDT  To: Darnell Davenport MD

## 2022-09-13 ENCOUNTER — PATIENT MESSAGE (OUTPATIENT)
Dept: INTERNAL MEDICINE | Facility: CLINIC | Age: 57
End: 2022-09-13
Payer: COMMERCIAL

## 2022-09-15 ENCOUNTER — PATIENT MESSAGE (OUTPATIENT)
Dept: ADMINISTRATIVE | Facility: OTHER | Age: 57
End: 2022-09-15
Payer: COMMERCIAL

## 2022-09-22 ENCOUNTER — PATIENT MESSAGE (OUTPATIENT)
Dept: ADMINISTRATIVE | Facility: OTHER | Age: 57
End: 2022-09-22
Payer: COMMERCIAL

## 2022-09-22 ENCOUNTER — PATIENT MESSAGE (OUTPATIENT)
Dept: INTERNAL MEDICINE | Facility: CLINIC | Age: 57
End: 2022-09-22
Payer: COMMERCIAL

## 2022-09-22 NOTE — TELEPHONE ENCOUNTER
Pt c/o testing positive for covid. Symptoms started on 09/09/2022 symptoms of headaches, muscle aches & pains, fatigue, loss of appetite,  feeling dizzy, diarrhea and feeling nauseous. Pt requesting zpak please advise.  LOV:01/12/2022  RTC:not on file

## 2022-09-23 RX ORDER — AZITHROMYCIN 250 MG/1
TABLET, FILM COATED ORAL
Qty: 6 TABLET | Refills: 0 | Status: SHIPPED | OUTPATIENT
Start: 2022-09-23 | End: 2022-09-28

## 2022-09-25 ENCOUNTER — PATIENT MESSAGE (OUTPATIENT)
Dept: INTERNAL MEDICINE | Facility: CLINIC | Age: 57
End: 2022-09-25
Payer: COMMERCIAL

## 2022-10-14 ENCOUNTER — PATIENT MESSAGE (OUTPATIENT)
Dept: INTERNAL MEDICINE | Facility: CLINIC | Age: 57
End: 2022-10-14
Payer: COMMERCIAL

## 2022-10-14 RX ORDER — DIAZEPAM 5 MG/1
5 TABLET ORAL NIGHTLY PRN
Qty: 10 TABLET | Refills: 0 | Status: SHIPPED | OUTPATIENT
Start: 2022-10-14 | End: 2023-09-11

## 2022-10-14 NOTE — TELEPHONE ENCOUNTER
Spoke to pt, her mother passed away and she is requesting something stronger then trazadone to help her sleep and stay asleep. Please send to [facility]. The  is tomorrow 10/15/2022

## 2022-10-20 DIAGNOSIS — Z12.31 OTHER SCREENING MAMMOGRAM: ICD-10-CM

## 2022-10-24 RX ORDER — FLUTICASONE PROPIONATE 50 MCG
1 SPRAY, SUSPENSION (ML) NASAL CONTINUOUS PRN
Qty: 16 G | Refills: 3 | Status: SHIPPED | OUTPATIENT
Start: 2022-10-24 | End: 2022-10-25 | Stop reason: SDUPTHER

## 2022-10-25 ENCOUNTER — PATIENT MESSAGE (OUTPATIENT)
Dept: ADMINISTRATIVE | Facility: HOSPITAL | Age: 57
End: 2022-10-25
Payer: COMMERCIAL

## 2022-10-25 RX ORDER — FLUTICASONE PROPIONATE 50 MCG
1 SPRAY, SUSPENSION (ML) NASAL CONTINUOUS PRN
Qty: 16 G | Refills: 3 | Status: SHIPPED | OUTPATIENT
Start: 2022-10-25 | End: 2023-03-15 | Stop reason: SDUPTHER

## 2022-11-14 ENCOUNTER — PATIENT MESSAGE (OUTPATIENT)
Dept: PSYCHIATRY | Facility: CLINIC | Age: 57
End: 2022-11-14
Payer: COMMERCIAL

## 2023-01-30 ENCOUNTER — PATIENT MESSAGE (OUTPATIENT)
Dept: INFECTIOUS DISEASES | Facility: CLINIC | Age: 58
End: 2023-01-30
Payer: COMMERCIAL

## 2023-03-15 DIAGNOSIS — Z78.0 MENOPAUSE: ICD-10-CM

## 2023-03-15 RX ORDER — ESTRADIOL 1 MG/1
1 TABLET ORAL DAILY
Qty: 90 TABLET | Refills: 4 | Status: SHIPPED | OUTPATIENT
Start: 2023-03-15 | End: 2023-04-06 | Stop reason: SDUPTHER

## 2023-03-15 RX ORDER — FLUTICASONE PROPIONATE 50 MCG
1 SPRAY, SUSPENSION (ML) NASAL CONTINUOUS PRN
Qty: 16 G | Refills: 3 | Status: SHIPPED | OUTPATIENT
Start: 2023-03-15 | End: 2024-01-11 | Stop reason: SDUPTHER

## 2023-04-03 ENCOUNTER — PATIENT MESSAGE (OUTPATIENT)
Dept: ADMINISTRATIVE | Facility: HOSPITAL | Age: 58
End: 2023-04-03

## 2023-06-05 RX ORDER — ACETAMINOPHEN AND CODEINE PHOSPHATE 120; 12 MG/5ML; MG/5ML
1 SOLUTION ORAL DAILY
Qty: 84 TABLET | Refills: 1 | Status: SHIPPED | OUTPATIENT
Start: 2023-06-05 | End: 2024-01-11 | Stop reason: SDUPTHER

## 2023-08-13 DIAGNOSIS — Z78.0 MENOPAUSE: ICD-10-CM

## 2023-08-14 RX ORDER — ESTRADIOL 1 MG/1
1 TABLET ORAL DAILY
Qty: 90 TABLET | Refills: 4 | Status: SHIPPED | OUTPATIENT
Start: 2023-08-14 | End: 2024-03-30 | Stop reason: SDUPTHER

## 2023-08-14 NOTE — TELEPHONE ENCOUNTER
Refill Routing Note   Medication(s) are not appropriate for processing by [facility] for the following reason(s):      Medication outside of protocol: non-delegated    [facility] action(s):  Route Care Due:  None identified          Appointments  past 12m or future 3m with PCP    Date Provider   Last Visit   1/12/2022 Grace Casillas MD   Next Visit   10/2/2023 Grace Casillas MD   ED visits in past 90 days: 0        Note composed:10:43 AM 08/14/2023

## 2023-08-18 ENCOUNTER — PATIENT MESSAGE (OUTPATIENT)
Dept: ADMINISTRATIVE | Facility: HOSPITAL | Age: 58
End: 2023-08-18
Payer: COMMERCIAL

## 2023-09-01 DIAGNOSIS — F41.0 PANIC ATTACKS: ICD-10-CM

## 2023-09-01 DIAGNOSIS — F33.2 SEVERE EPISODE OF RECURRENT MAJOR DEPRESSIVE DISORDER, WITHOUT PSYCHOTIC FEATURES: ICD-10-CM

## 2023-09-01 RX ORDER — DULOXETIN HYDROCHLORIDE 60 MG/1
60 CAPSULE, DELAYED RELEASE ORAL DAILY
Qty: 90 CAPSULE | Refills: 1 | Status: SHIPPED | OUTPATIENT
Start: 2023-09-01 | End: 2024-03-30 | Stop reason: SDUPTHER

## 2023-09-01 NOTE — TELEPHONE ENCOUNTER
Refill Routing Note   Medication(s) are not appropriate for processing by [facility] for the following reason(s):      Patient not seen by provider within 15 months  Required labs outdated  Required vitals outdated    [facility] action(s):  Defer Care Due:  None identified            Appointments  past 12m or future 3m with PCP    Date Provider   Last Visit   1/12/2022 Grace Casillas MD   Next Visit   10/2/2023 Grace Casillas MD   ED visits in past 90 days: 0        Note composed:1:58 PM 09/01/2023

## 2023-09-01 NOTE — TELEPHONE ENCOUNTER
No care due was identified.  [facility] Embedded Care Due Messages. Reference number: [id number].   9/01/2023 9:32:08 AM CDT

## 2023-09-11 ENCOUNTER — OFFICE VISIT (OUTPATIENT)
Dept: OBSTETRICS AND GYNECOLOGY | Facility: CLINIC | Age: 58
End: 2023-09-11
Payer: COMMERCIAL

## 2023-09-11 VITALS — BODY MASS INDEX: 21.53 KG/M2 | WEIGHT: 129.44 LBS

## 2023-09-11 DIAGNOSIS — Z12.31 ENCOUNTER FOR SCREENING MAMMOGRAM FOR BREAST CANCER: ICD-10-CM

## 2023-09-11 DIAGNOSIS — F41.9 ANXIETY: ICD-10-CM

## 2023-09-11 DIAGNOSIS — Z11.3 ROUTINE SCREENING FOR STI (SEXUALLY TRANSMITTED INFECTION): ICD-10-CM

## 2023-09-11 DIAGNOSIS — Z01.419 WELL WOMAN EXAM: Primary | ICD-10-CM

## 2023-09-11 PROCEDURE — 1159F PR MEDICATION LIST DOCUMENTED IN MEDICAL RECORD: ICD-10-PCS | Mod: CPTII,S$GLB,,

## 2023-09-11 PROCEDURE — 1160F RVW MEDS BY RX/DR IN RCRD: CPT | Mod: CPTII,S$GLB,,

## 2023-09-11 PROCEDURE — 3008F PR BODY MASS INDEX (BMI) DOCUMENTED: ICD-10-PCS | Mod: CPTII,S$GLB,,

## 2023-09-11 PROCEDURE — 88175 CYTOPATH C/V AUTO FLUID REDO: CPT

## 2023-09-11 PROCEDURE — 99999 PR PBB SHADOW E&M-EST. PATIENT-LVL III: ICD-10-PCS | Mod: PBBFAC,,,

## 2023-09-11 PROCEDURE — 99999 PR PBB SHADOW E&M-EST. PATIENT-LVL III: CPT | Mod: PBBFAC,,,

## 2023-09-11 PROCEDURE — 81514 NFCT DS BV&VAGINITIS DNA ALG: CPT

## 2023-09-11 PROCEDURE — 99386 PR PREVENTIVE VISIT,NEW,40-64: ICD-10-PCS | Mod: 25,S$GLB,,

## 2023-09-11 PROCEDURE — 87591 N.GONORRHOEAE DNA AMP PROB: CPT

## 2023-09-11 PROCEDURE — 3008F BODY MASS INDEX DOCD: CPT | Mod: CPTII,S$GLB,,

## 2023-09-11 PROCEDURE — 1159F MED LIST DOCD IN RCRD: CPT | Mod: CPTII,S$GLB,,

## 2023-09-11 PROCEDURE — 99386 PREV VISIT NEW AGE 40-64: CPT | Mod: 25,S$GLB,,

## 2023-09-11 PROCEDURE — 1160F PR REVIEW ALL MEDS BY PRESCRIBER/CLIN PHARMACIST DOCUMENTED: ICD-10-PCS | Mod: CPTII,S$GLB,,

## 2023-09-11 PROCEDURE — 87624 HPV HI-RISK TYP POOLED RSLT: CPT
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
## 2023-09-13 LAB
BACTERIAL VAGINOSIS DNA: NEGATIVE
C TRACH DNA SPEC QL NAA+PROBE: NOT DETECTED
CANDIDA GLABRATA DNA: NEGATIVE
CANDIDA KRUSEI DNA: NEGATIVE
CANDIDA RRNA VAG QL PROBE: NEGATIVE
N GONORRHOEA DNA SPEC QL NAA+PROBE: NOT DETECTED
T VAGINALIS RRNA GENITAL QL PROBE: NEGATIVE

## 2023-09-14 LAB
HPV HR 12 DNA SPEC QL NAA+PROBE: NEGATIVE
HPV16 AG SPEC QL: NEGATIVE
HPV18 DNA SPEC QL NAA+PROBE: NEGATIVE

## 2023-09-15 LAB
FINAL PATHOLOGIC DIAGNOSIS: NORMAL
Lab: NORMAL

## 2023-09-22 ENCOUNTER — HOSPITAL ENCOUNTER (OUTPATIENT)
Dept: RADIOLOGY | Facility: HOSPITAL | Age: 58
Discharge: HOME | End: 2023-09-22
Payer: COMMERCIAL

## 2023-09-22 VITALS — HEIGHT: 65 IN | BODY MASS INDEX: 21.49 KG/M2 | WEIGHT: 129 LBS

## 2023-09-22 DIAGNOSIS — Z12.31 ENCOUNTER FOR SCREENING MAMMOGRAM FOR BREAST CANCER: ICD-10-CM

## 2023-09-22 PROCEDURE — 77067 SCR MAMMO BI INCL CAD: CPT | Mod: 26,,, | Performed by: RADIOLOGY

## 2023-09-22 PROCEDURE — 77067 SCR MAMMO BI INCL CAD: CPT | Mod: TC

## 2023-09-22 PROCEDURE — 77067 MAMMO DIGITAL SCREENING BILAT WITH TOMO: ICD-10-PCS | Mod: 26,,, | Performed by: RADIOLOGY

## 2023-09-22 PROCEDURE — 77063 BREAST TOMOSYNTHESIS BI: CPT | Mod: 26,,, | Performed by: RADIOLOGY

## 2023-09-22 PROCEDURE — 77063 MAMMO DIGITAL SCREENING BILAT WITH TOMO: ICD-10-PCS | Mod: 26,,, | Performed by: RADIOLOGY

## 2023-09-25 ENCOUNTER — PATIENT MESSAGE (OUTPATIENT)
Dept: INTERNAL MEDICINE | Facility: CLINIC | Age: 58
End: 2023-09-25
Payer: COMMERCIAL

## 2023-10-02 ENCOUNTER — OFFICE VISIT (OUTPATIENT)
Dept: INTERNAL MEDICINE | Facility: CLINIC | Age: 58
End: 2023-10-02
Payer: COMMERCIAL

## 2023-10-02 VITALS
RESPIRATION RATE: 18 BRPM | HEART RATE: 104 BPM | BODY MASS INDEX: 22.15 KG/M2 | HEIGHT: 65 IN | TEMPERATURE: 98 F | DIASTOLIC BLOOD PRESSURE: 82 MMHG | OXYGEN SATURATION: 100 % | WEIGHT: 132.94 LBS | SYSTOLIC BLOOD PRESSURE: 138 MMHG

## 2023-10-02 DIAGNOSIS — Z00.00 ROUTINE MEDICAL EXAM: Primary | ICD-10-CM

## 2023-10-02 PROCEDURE — 1160F RVW MEDS BY RX/DR IN RCRD: CPT | Mod: CPTII,S$GLB,, | Performed by: INTERNAL MEDICINE

## 2023-10-02 PROCEDURE — 90686 FLU VACCINE (QUAD) GREATER THAN OR EQUAL TO 3YO PRESERVATIVE FREE IM: ICD-10-PCS | Mod: S$GLB,,, | Performed by: INTERNAL MEDICINE

## 2023-10-02 PROCEDURE — 90686 IIV4 VACC NO PRSV 0.5 ML IM: CPT | Mod: S$GLB,,, | Performed by: INTERNAL MEDICINE

## 2023-10-02 PROCEDURE — 90471 IMMUNIZATION ADMIN: CPT | Mod: S$GLB,,, | Performed by: INTERNAL MEDICINE

## 2023-10-02 PROCEDURE — 3075F SYST BP GE 130 - 139MM HG: CPT | Mod: CPTII,S$GLB,, | Performed by: INTERNAL MEDICINE

## 2023-10-02 PROCEDURE — 90471 FLU VACCINE (QUAD) GREATER THAN OR EQUAL TO 3YO PRESERVATIVE FREE IM: ICD-10-PCS | Mod: S$GLB,,, | Performed by: INTERNAL MEDICINE

## 2023-10-02 PROCEDURE — 99396 PREV VISIT EST AGE 40-64: CPT | Mod: 25,S$GLB,, | Performed by: INTERNAL MEDICINE

## 2023-10-02 PROCEDURE — 3008F BODY MASS INDEX DOCD: CPT | Mod: CPTII,S$GLB,, | Performed by: INTERNAL MEDICINE

## 2023-10-02 PROCEDURE — 1159F MED LIST DOCD IN RCRD: CPT | Mod: CPTII,S$GLB,, | Performed by: INTERNAL MEDICINE

## 2023-10-02 PROCEDURE — 99999 PR PBB SHADOW E&M-EST. PATIENT-LVL V: ICD-10-PCS | Mod: PBBFAC,,, | Performed by: INTERNAL MEDICINE

## 2023-10-02 PROCEDURE — 3008F PR BODY MASS INDEX (BMI) DOCUMENTED: ICD-10-PCS | Mod: CPTII,S$GLB,, | Performed by: INTERNAL MEDICINE

## 2023-10-02 PROCEDURE — 99999 PR PBB SHADOW E&M-EST. PATIENT-LVL V: CPT | Mod: PBBFAC,,, | Performed by: INTERNAL MEDICINE

## 2023-10-02 PROCEDURE — 3044F PR MOST RECENT HEMOGLOBIN A1C LEVEL <7.0%: ICD-10-PCS | Mod: CPTII,S$GLB,, | Performed by: INTERNAL MEDICINE

## 2023-10-02 PROCEDURE — 3079F PR MOST RECENT DIASTOLIC BLOOD PRESSURE 80-89 MM HG: ICD-10-PCS | Mod: CPTII,S$GLB,, | Performed by: INTERNAL MEDICINE

## 2023-10-02 PROCEDURE — 3079F DIAST BP 80-89 MM HG: CPT | Mod: CPTII,S$GLB,, | Performed by: INTERNAL MEDICINE

## 2023-10-02 PROCEDURE — 1160F PR REVIEW ALL MEDS BY PRESCRIBER/CLIN PHARMACIST DOCUMENTED: ICD-10-PCS | Mod: CPTII,S$GLB,, | Performed by: INTERNAL MEDICINE

## 2023-10-02 PROCEDURE — 3075F PR MOST RECENT SYSTOLIC BLOOD PRESS GE 130-139MM HG: ICD-10-PCS | Mod: CPTII,S$GLB,, | Performed by: INTERNAL MEDICINE

## 2023-10-02 PROCEDURE — 99396 PR PREVENTIVE VISIT,EST,40-64: ICD-10-PCS | Mod: 25,S$GLB,, | Performed by: INTERNAL MEDICINE

## 2023-10-02 PROCEDURE — 1159F PR MEDICATION LIST DOCUMENTED IN MEDICAL RECORD: ICD-10-PCS | Mod: CPTII,S$GLB,, | Performed by: INTERNAL MEDICINE

## 2023-10-02 PROCEDURE — 3044F HG A1C LEVEL LT 7.0%: CPT | Mod: CPTII,S$GLB,, | Performed by: INTERNAL MEDICINE

## 2023-10-02 RX ORDER — IVERMECTIN 10 MG/G
CREAM TOPICAL
COMMUNITY
Start: 2023-05-23

## 2023-10-02 NOTE — PROGRESS NOTES
The patient is a 58 y.o. old female who presents to the office for a physical.    PAST MEDICAL HISTORY  Past Medical History:   Diagnosis Date    Anxiety     Substance abuse     ETOH, Cocaine, Methamphetamines (Ecstasy, Crystal meth)       SURGICAL HISTORY:  Past Surgical History:   Procedure Laterality Date     SECTION           MEDS:  Medcard reviewed and updated    ALLERGIES: Allergy Card reviewed and updated    SOCIAL HISTORY:   The patient is a nonsmoker, denies alcohol or illicit drug use.    ROS:  GENERAL: No fever, chills, fatigability or weight loss.  SKIN: No rashes.  HEAD: Occasional headaches.  Denies recent head trauma.  EYES: No photophobia, ocular pain or diplopia.  She reports changes in vision  EARS: Denies ear pain, discharge or vertigo.  NOSE: No epistaxis or postnasal drip.  MOUTH & THROAT: No hoarseness or change in voice.   NODES: Denies swollen glands.  CHEST: Denies shortness of breath, wheezing, cough and sputum production.  CARDIOVASCULAR: Denies chest pain or palpitations.  ABDOMEN: Appetite fine. Denies diarrhea, abdominal pain, constipation or blood in stool.  URINARY: No dysuria or hematuria.  MUSCULOSKELETAL: Neck pain.Joint pain has improved. Denies back pain.  NEUROLOGIC: No history of seizures.  ENDOCRINE: Denies polyuria or polydipsia.  PSYCHIATRIC: Denies mood swings, depression, anxiety, homicidal or suicidal thoughts.    SCREENINGS:  Last cholesterol: .  Last colonoscopy/cologuard:   Last mammogram:   Last Pap smear:   Last tetanus:   Last Pneumovax: none  Last eye exam:   Last bone density: none  Last menstrual period: menopause    PE:   Vitals:  Vitals:    10/02/23 1510   BP: (!) 160/110   Pulse: 104   Resp: 18   Temp: 98.1 °F (36.7 °C)       APPEARANCE: Well nourished, well developed, in no acute distress.    EYES: Sclerae anicteric. PERRL. EOMI.      EARS: TM's intact. No retraction or perforation.    NOSE: Mucosa pink. Airway clear.  MOUTH &  THROAT: No tonsillar enlargement. No pharyngeal erythema or exudate. No stridor.  NECK: Supple, no thyromegaly.  CHEST: Lungs clear to auscultation with unlabored respirations.  CARDIOVASCULAR: Normal S1, S2. No murmurs. No carotid bruits. No pedal edema.  ABDOMEN: Bowel sounds normal. Not distended. Soft. No tenderness or masses.   MUSCULOSKELETAL:  Normal gait, no cyanosis or clubbing.   SKIN: Normal skin turgor, warm and dry.  NEUROLOGIC: Cranial Nerves: Intact.  PSYCHIATRIC: The patient is oriented to person, place, and time and has a pleasant affect.        ASSESSMENT/PLAN:  Soledad was seen today for annual exam.    Diagnoses and all orders for this visit:    Routine medical exam  -     CBC Auto Differential; Future  -     Comprehensive Metabolic Panel; Future  -     Lipid Panel; Future  -     TSH; Future  -     Hemoglobin A1C; Future            Answers submitted by the patient for this visit:  Review of Systems Questionnaire (Submitted on 9/25/2023)  activity change: Yes  unexpected weight change: Yes  neck pain: Yes  hearing loss: No  rhinorrhea: Yes  trouble swallowing: No  eye discharge: Yes  visual disturbance: Yes  chest tightness: No  wheezing: No  chest pain: No  palpitations: No  blood in stool: No  constipation: No  vomiting: No  diarrhea: No  polydipsia: No  polyuria: No  difficulty urinating: No  hematuria: No  menstrual problem: No  dysuria: No  joint swelling: Yes  arthralgias: Yes  headaches: Yes  weakness: Yes  confusion: Yes  dysphoric mood: Yes

## 2023-10-12 NOTE — TELEPHONE ENCOUNTER
----- Message from Betsy English sent at 3/8/2017  8:07 AM CST -----  Contact: self  [phone]  Patient would like to get test results.  Name of test (lab, mammo, etc.):  EKG  Date of test:  02/22  Ordering provider: Dr. Casillas   Where was the test performed:  [facility]     Comments: Pt states something was supposed to be faxed to her work stating she is ok to return to work and wanted to know if this was sent. Pt also states something was supposed to be called in for hemorrhoids but she has not heard if anything was called in.    No

## 2023-10-20 ENCOUNTER — LAB VISIT (OUTPATIENT)
Dept: LAB | Facility: HOSPITAL | Age: 58
End: 2023-10-20
Attending: INTERNAL MEDICINE
Payer: COMMERCIAL

## 2023-10-20 DIAGNOSIS — Z00.00 ROUTINE MEDICAL EXAM: ICD-10-CM

## 2023-10-20 LAB
ALBUMIN SERPL BCP-MCNC: 3.6 G/DL (ref 3.5–5.2)
ALP SERPL-CCNC: 76 U/L (ref 55–135)
ALT SERPL W/O P-5'-P-CCNC: 14 U/L (ref 10–44)
ANION GAP SERPL CALC-SCNC: 5 MMOL/L (ref 8–16)
AST SERPL-CCNC: 13 U/L (ref 10–40)
BASOPHILS # BLD AUTO: 0.05 K/UL (ref 0–0.2)
BASOPHILS NFR BLD: 0.8 % (ref 0–1.9)
BILIRUB SERPL-MCNC: 0.8 MG/DL (ref 0.1–1)
BUN SERPL-MCNC: 12 MG/DL (ref 6–20)
CALCIUM SERPL-MCNC: 9 MG/DL (ref 8.7–10.5)
CHLORIDE SERPL-SCNC: 111 MMOL/L (ref 95–110)
CHOLEST SERPL-MCNC: 186 MG/DL (ref 120–199)
CHOLEST/HDLC SERPL: 2.9 {RATIO} (ref 2–5)
CO2 SERPL-SCNC: 26 MMOL/L (ref 23–29)
CREAT SERPL-MCNC: 1 MG/DL (ref 0.5–1.4)
DIFFERENTIAL METHOD: ABNORMAL
EOSINOPHIL # BLD AUTO: 0.2 K/UL (ref 0–0.5)
EOSINOPHIL NFR BLD: 3.4 % (ref 0–8)
ERYTHROCYTE [DISTWIDTH] IN BLOOD BY AUTOMATED COUNT: 13.7 % (ref 11.5–14.5)
EST. GFR  (NO RACE VARIABLE): >60 ML/MIN/1.73 M^2
ESTIMATED AVG GLUCOSE: 105 MG/DL (ref 68–131)
GLUCOSE SERPL-MCNC: 94 MG/DL (ref 70–110)
HBA1C MFR BLD: 5.3 % (ref 4–5.6)
HCT VFR BLD AUTO: 43.2 % (ref 37–48.5)
HDLC SERPL-MCNC: 64 MG/DL (ref 40–75)
HDLC SERPL: 34.4 % (ref 20–50)
HGB BLD-MCNC: 13.6 G/DL (ref 12–16)
IMM GRANULOCYTES # BLD AUTO: 0.01 K/UL (ref 0–0.04)
IMM GRANULOCYTES NFR BLD AUTO: 0.2 % (ref 0–0.5)
LDLC SERPL CALC-MCNC: 111.4 MG/DL (ref 63–159)
LYMPHOCYTES # BLD AUTO: 2 K/UL (ref 1–4.8)
LYMPHOCYTES NFR BLD: 30.4 % (ref 18–48)
MCH RBC QN AUTO: 31.1 PG (ref 27–31)
MCHC RBC AUTO-ENTMCNC: 31.5 G/DL (ref 32–36)
MCV RBC AUTO: 99 FL (ref 82–98)
MONOCYTES # BLD AUTO: 0.5 K/UL (ref 0.3–1)
MONOCYTES NFR BLD: 8.3 % (ref 4–15)
NEUTROPHILS # BLD AUTO: 3.7 K/UL (ref 1.8–7.7)
NEUTROPHILS NFR BLD: 56.9 % (ref 38–73)
NONHDLC SERPL-MCNC: 122 MG/DL
NRBC BLD-RTO: 0 /100 WBC
PLATELET # BLD AUTO: 243 K/UL (ref 150–450)
PMV BLD AUTO: 10.2 FL (ref 9.2–12.9)
POTASSIUM SERPL-SCNC: 4.2 MMOL/L (ref 3.5–5.1)
PROT SERPL-MCNC: 6.5 G/DL (ref 6–8.4)
RBC # BLD AUTO: 4.38 M/UL (ref 4–5.4)
SODIUM SERPL-SCNC: 142 MMOL/L (ref 136–145)
TRIGL SERPL-MCNC: 53 MG/DL (ref 30–150)
TSH SERPL DL<=0.005 MIU/L-ACNC: 0.61 UIU/ML (ref 0.4–4)
WBC # BLD AUTO: 6.49 K/UL (ref 3.9–12.7)

## 2023-10-20 PROCEDURE — 80053 COMPREHEN METABOLIC PANEL: CPT | Performed by: INTERNAL MEDICINE

## 2023-10-20 PROCEDURE — 36415 COLL VENOUS BLD VENIPUNCTURE: CPT | Mod: PO | Performed by: INTERNAL MEDICINE

## 2023-10-20 PROCEDURE — 80061 LIPID PANEL: CPT | Performed by: INTERNAL MEDICINE

## 2023-10-20 PROCEDURE — 83036 HEMOGLOBIN GLYCOSYLATED A1C: CPT | Performed by: INTERNAL MEDICINE

## 2023-10-20 PROCEDURE — 85025 COMPLETE CBC W/AUTO DIFF WBC: CPT | Performed by: INTERNAL MEDICINE

## 2023-10-20 PROCEDURE — 84443 ASSAY THYROID STIM HORMONE: CPT | Performed by: INTERNAL MEDICINE

## 2024-01-11 RX ORDER — FLUTICASONE PROPIONATE 50 MCG
1 SPRAY, SUSPENSION (ML) NASAL CONTINUOUS PRN
Qty: 32 G | Refills: 2 | Status: SHIPPED | OUTPATIENT
Start: 2024-01-11

## 2024-01-11 RX ORDER — ACETAMINOPHEN AND CODEINE PHOSPHATE 120; 12 MG/5ML; MG/5ML
1 SOLUTION ORAL DAILY
Qty: 84 TABLET | Refills: 1 | Status: SHIPPED | OUTPATIENT
Start: 2024-01-11

## 2024-01-11 NOTE — TELEPHONE ENCOUNTER
No care due was identified.  [facility] Embedded Care Due Messages. Reference number: [id number].   1/11/2024 4:06:42 PM CST

## 2024-01-11 NOTE — TELEPHONE ENCOUNTER
Refill Decision Note   Soledad Onofre  is requesting a refill authorization.  Brief Assessment and Rationale for Refill:  Approve     Medication Therapy Plan:         Comments:     Note composed:4:28 PM 01/11/2024

## 2024-01-11 NOTE — TELEPHONE ENCOUNTER
No care due was identified.  [facility] Embedded Care Due Messages. Reference number: [id number].   1/11/2024 4:02:22 PM CST

## 2024-03-30 DIAGNOSIS — F33.2 SEVERE EPISODE OF RECURRENT MAJOR DEPRESSIVE DISORDER, WITHOUT PSYCHOTIC FEATURES: ICD-10-CM

## 2024-03-30 DIAGNOSIS — Z78.0 MENOPAUSE: ICD-10-CM

## 2024-03-30 DIAGNOSIS — F41.0 PANIC ATTACKS: ICD-10-CM

## 2024-03-31 NOTE — TELEPHONE ENCOUNTER
No care due was identified.  [facility] Embedded Care Due Messages. Reference number: [id number].   3/30/2024 9:09:18 PM CDT

## 2024-04-01 RX ORDER — DULOXETIN HYDROCHLORIDE 60 MG/1
60 CAPSULE, DELAYED RELEASE ORAL DAILY
Qty: 90 CAPSULE | Refills: 1 | Status: SHIPPED | OUTPATIENT
Start: 2024-04-01

## 2024-04-01 RX ORDER — NORETHINDRONE 0.35 MG/1
1 TABLET ORAL DAILY
Qty: 84 TABLET | Refills: 1 | Status: SHIPPED | OUTPATIENT
Start: 2024-04-01

## 2024-04-01 RX ORDER — ESTRADIOL 1 MG/1
1 TABLET ORAL DAILY
Qty: 90 TABLET | Refills: 1 | Status: SHIPPED | OUTPATIENT
Start: 2024-04-01

## 2024-04-01 NOTE — TELEPHONE ENCOUNTER
Refill Routing Note   Medication(s) are not appropriate for processing by [facility] for the following reason(s):        Drug-disease interaction:     This drug's indication(s) for use do(es) not normally occur in geriatric and/or post-menopausal patients; therefore, dosing does not apply.          [facility] action(s):  Defer  Approve      Medication Therapy Plan:       Pharmacist review requested: Yes     Appointments  past 12m or future 3m with PCP    Date Provider   Last Visit   10/2/2023 Grace Casillas MD   Next Visit   7/15/2024 Grace Casillas MD   ED visits in past 90 days: 0        Note composed:12:42 PM 04/01/2024

## 2024-04-01 NOTE — TELEPHONE ENCOUNTER
Refill Decision Note   Soledad Onofre  is requesting a refill authorization.  Brief Assessment and Rationale for Refill:  Approve     Medication Therapy Plan:        Pharmacist review requested: Yes   Extended chart review required: Yes   Comments:     Note composed:12:56 PM 04/01/2024

## 2024-04-11 ENCOUNTER — PATIENT MESSAGE (OUTPATIENT)
Dept: OBSTETRICS AND GYNECOLOGY | Facility: CLINIC | Age: 59
End: 2024-04-11
Payer: COMMERCIAL

## 2024-10-09 DIAGNOSIS — Z12.31 OTHER SCREENING MAMMOGRAM: ICD-10-CM

## 2024-11-10 DIAGNOSIS — F41.0 PANIC ATTACKS: ICD-10-CM

## 2024-11-10 DIAGNOSIS — F33.2 SEVERE EPISODE OF RECURRENT MAJOR DEPRESSIVE DISORDER, WITHOUT PSYCHOTIC FEATURES: ICD-10-CM

## 2024-11-10 NOTE — TELEPHONE ENCOUNTER
Refill Routing Note   Medication(s) are not appropriate for processing by [facility] for the following reason(s):        Required vitals outdated  Required labs outdated    [facility] action(s):  Defer             Appointments  past 12m or future 3m with PCP    Date Provider   Last Visit   10/2/2023 Grace Casillas MD   Next Visit   12/9/2024 Grace Casillas MD   ED visits in past 90 days: 0        Note composed:4:37 PM 11/10/2024

## 2024-11-10 NOTE — TELEPHONE ENCOUNTER
No care due was identified.  [facility] Embedded Care Due Messages. Reference number: [id number].   11/10/2024 3:41:00 PM CST

## 2024-11-11 RX ORDER — DULOXETIN HYDROCHLORIDE 60 MG/1
60 CAPSULE, DELAYED RELEASE ORAL
Qty: 90 CAPSULE | Refills: 0 | Status: SHIPPED | OUTPATIENT
Start: 2024-11-11

## 2025-01-13 DIAGNOSIS — Z78.0 MENOPAUSE: ICD-10-CM

## 2025-01-13 RX ORDER — ESTRADIOL 1 MG/1
1 TABLET ORAL
Qty: 90 TABLET | Refills: 1 | Status: SHIPPED | OUTPATIENT
Start: 2025-01-13

## 2025-01-13 NOTE — TELEPHONE ENCOUNTER
Care Due:                  Date            Visit Type   Department     Provider  --------------------------------------------------------------------------------                                MYCHART                              ANNUAL                              CHECKUP/PHY  MET INTERNAL  Last Visit: 10-      [facility]       Grace Casillas                              [facility]                              ANNUAL                              CHECKUP/PHY  [facility] INTERNAL  Next Visit: 03-      [facility]       Grace Casillas                                                            Last  Test          Frequency    Reason                     Performed    Due Date  --------------------------------------------------------------------------------    Cr..........  12 months..  DULoxetine...............  10-   10-    [facility] Embedded Care Due Messages. Reference number: [id number].   1/13/2025 4:30:03 PM CST

## 2025-02-14 DIAGNOSIS — F41.0 PANIC ATTACKS: ICD-10-CM

## 2025-02-14 DIAGNOSIS — F33.2 SEVERE EPISODE OF RECURRENT MAJOR DEPRESSIVE DISORDER, WITHOUT PSYCHOTIC FEATURES: ICD-10-CM

## 2025-02-14 RX ORDER — DULOXETIN HYDROCHLORIDE 60 MG/1
60 CAPSULE, DELAYED RELEASE ORAL
Qty: 90 CAPSULE | Refills: 0 | Status: SHIPPED | OUTPATIENT
Start: 2025-02-14

## 2025-02-14 NOTE — TELEPHONE ENCOUNTER
No care due was identified.  [facility] Embedded Care Due Messages. Reference number: [id number].   2/14/2025 12:36:34 AM CST

## 2025-02-14 NOTE — TELEPHONE ENCOUNTER
Refill Routing Note   Medication(s) are not appropriate for processing by [facility] for the following reason(s):        Patient not seen by provider within 15 months  Required labs outdated    [facility] action(s):  Defer               Appointments  past 12m or future 3m with PCP    Date Provider   Last Visit   10/2/2023 Grace Casillas MD   Next Visit   3/24/2025 Grace Casillas MD   ED visits in past 90 days: 0        Note composed:10:07 AM 02/14/2025

## 2025-03-22 DIAGNOSIS — F41.0 PANIC ATTACKS: ICD-10-CM

## 2025-03-22 DIAGNOSIS — F33.2 SEVERE EPISODE OF RECURRENT MAJOR DEPRESSIVE DISORDER, WITHOUT PSYCHOTIC FEATURES: ICD-10-CM

## 2025-03-22 NOTE — TELEPHONE ENCOUNTER
Care Due:                  Date            Visit Type   Department     Provider  --------------------------------------------------------------------------------                                MYCHART                              ANNUAL                              CHECKUP/PHY  MET INTERNAL  Last Visit: 10-      [facility]       Grace Casillas                              [facility]                              ANNUAL                              CHECKUP/PHY  Health system INTERNAL  Next Visit: 03-      [facility]       Grace Casillas                                                            Last  Test          Frequency    Reason                     Performed    Due Date  --------------------------------------------------------------------------------    Cr..........  12 months..  DULoxetine...............  10-   10-    Health [facility] Embedded Care Due Messages. Reference number: [id number].   3/22/2025 4:25:48 PM CDT

## 2025-03-22 NOTE — TELEPHONE ENCOUNTER
No care due was identified.  [facility] Embedded Care Due Messages. Reference number: [id number].   3/22/2025 4:27:05 PM CDT

## 2025-03-24 RX ORDER — DULOXETIN HYDROCHLORIDE 60 MG/1
60 CAPSULE, DELAYED RELEASE ORAL DAILY
Qty: 90 CAPSULE | Refills: 3 | Status: SHIPPED | OUTPATIENT
Start: 2025-03-24

## 2025-03-24 RX ORDER — NORETHINDRONE 0.35 MG/1
1 TABLET ORAL DAILY
Qty: 84 TABLET | Refills: 3 | Status: SHIPPED | OUTPATIENT
Start: 2025-03-24

## 2025-03-24 NOTE — TELEPHONE ENCOUNTER
Refill Routing Note   Medication(s) are not appropriate for processing by [facility] for the following reason(s):        Required labs outdated    [facility] action(s):  Defer     Requires labs : Yes             Appointments  past 12m or future 3m with PCP    Date Provider   Last Visit   10/2/2023 Grace Casillas MD   Next Visit   3/24/2025 Grace Casillas MD   ED visits in past 90 days: 0        Note composed:8:37 AM 03/24/2025

## 2025-03-24 NOTE — TELEPHONE ENCOUNTER
Refill Routing Note   Medication(s) are not appropriate for processing by [facility] for the following reason(s):        Patient not seen by provider within 15 months    [facility] action(s):  Defer               Appointments  past 12m or future 3m with PCP    Date Provider   Last Visit   10/2/2023 Grace Casillas MD   Next Visit   5/12/2025 Grace Casillas MD   ED visits in past 90 days: 0        Note composed:8:26 AM 03/24/2025

## 2025-05-27 DIAGNOSIS — Z78.0 MENOPAUSE: ICD-10-CM

## 2025-05-27 RX ORDER — ESTRADIOL 1 MG/1
1 TABLET ORAL DAILY
Qty: 90 TABLET | Refills: 3 | Status: SHIPPED | OUTPATIENT
Start: 2025-05-27

## 2025-05-27 NOTE — TELEPHONE ENCOUNTER
Refill Routing Note   Medication(s) are not appropriate for processing by [facility] for the following reason(s):        Required labs outdated:  Mammography is up-to-date per Health Maintenance   Required vitals outdated: BP  Patient not seen by provider within 15 months    [facility] action(s):  Defer   Requires appointment : Yes     Requires labs : Yes    Medication Therapy Plan: Lab(s) recommended: CMP      Appointments  past 12m or future 3m with PCP    Date Provider   Last Visit   10/2/2023 Grace Casillas MD   Next Visit   Visit date not found Grace Casillas MD   ED visits in past 90 days: 0        Note composed:11:32 AM 05/27/2025

## 2025-05-27 NOTE — TELEPHONE ENCOUNTER
Care Due:                  Date            Visit Type   Department     Provider  --------------------------------------------------------------------------------                                MYCHART                              ANNUAL                              CHECKUP/PHY  MET INTERNAL  Last Visit: 10-      [facility]       Grace Casillas  Next Visit: None Scheduled  None         None Found                                                            Last  Test          Frequency    Reason                     Performed    Due Date  --------------------------------------------------------------------------------    Office Visit  15 months..  DULoxetine, estradioL,     10-   12-                             norethindrone............    Cr..........  12 months..  DULoxetine...............  10-   10-    [facility] Embedded Care Due Messages. Reference number: [id number].   5/27/2025 9:11:40 AM CDT